# Patient Record
Sex: MALE | Race: BLACK OR AFRICAN AMERICAN | NOT HISPANIC OR LATINO
[De-identification: names, ages, dates, MRNs, and addresses within clinical notes are randomized per-mention and may not be internally consistent; named-entity substitution may affect disease eponyms.]

---

## 2017-02-07 ENCOUNTER — APPOINTMENT (OUTPATIENT)
Dept: RADIATION ONCOLOGY | Facility: CLINIC | Age: 72
End: 2017-02-07

## 2017-02-07 VITALS
BODY MASS INDEX: 27.28 KG/M2 | HEIGHT: 68 IN | SYSTOLIC BLOOD PRESSURE: 128 MMHG | DIASTOLIC BLOOD PRESSURE: 73 MMHG | OXYGEN SATURATION: 97 % | HEART RATE: 42 BPM | RESPIRATION RATE: 16 BRPM | WEIGHT: 180 LBS

## 2017-08-08 ENCOUNTER — APPOINTMENT (OUTPATIENT)
Dept: RADIATION ONCOLOGY | Facility: CLINIC | Age: 72
End: 2017-08-08
Payer: MEDICARE

## 2017-08-08 VITALS
BODY MASS INDEX: 28.98 KG/M2 | SYSTOLIC BLOOD PRESSURE: 118 MMHG | HEIGHT: 68 IN | RESPIRATION RATE: 16 BRPM | WEIGHT: 191.2 LBS | HEART RATE: 61 BPM | OXYGEN SATURATION: 100 % | TEMPERATURE: 97.3 F | DIASTOLIC BLOOD PRESSURE: 73 MMHG

## 2017-08-08 PROCEDURE — 99213 OFFICE O/P EST LOW 20 MIN: CPT

## 2017-08-22 ENCOUNTER — APPOINTMENT (OUTPATIENT)
Dept: NUCLEAR MEDICINE | Facility: IMAGING CENTER | Age: 72
End: 2017-08-22

## 2017-08-22 ENCOUNTER — APPOINTMENT (OUTPATIENT)
Dept: CT IMAGING | Facility: IMAGING CENTER | Age: 72
End: 2017-08-22

## 2018-02-06 ENCOUNTER — APPOINTMENT (OUTPATIENT)
Dept: RADIATION ONCOLOGY | Facility: CLINIC | Age: 73
End: 2018-02-06
Payer: MEDICARE

## 2018-02-27 ENCOUNTER — APPOINTMENT (OUTPATIENT)
Dept: RADIATION ONCOLOGY | Facility: CLINIC | Age: 73
End: 2018-02-27
Payer: MEDICARE

## 2018-02-27 VITALS
SYSTOLIC BLOOD PRESSURE: 143 MMHG | DIASTOLIC BLOOD PRESSURE: 80 MMHG | OXYGEN SATURATION: 99 % | HEART RATE: 53 BPM | TEMPERATURE: 98 F | BODY MASS INDEX: 28.79 KG/M2 | WEIGHT: 190 LBS | RESPIRATION RATE: 16 BRPM | HEIGHT: 68 IN

## 2018-02-27 PROCEDURE — 99213 OFFICE O/P EST LOW 20 MIN: CPT

## 2018-12-06 ENCOUNTER — APPOINTMENT (OUTPATIENT)
Dept: RADIATION ONCOLOGY | Facility: CLINIC | Age: 73
End: 2018-12-06
Payer: MEDICARE

## 2018-12-06 VITALS
WEIGHT: 187 LBS | SYSTOLIC BLOOD PRESSURE: 140 MMHG | HEIGHT: 68 IN | DIASTOLIC BLOOD PRESSURE: 60 MMHG | HEART RATE: 44 BPM | OXYGEN SATURATION: 99 % | BODY MASS INDEX: 28.34 KG/M2 | RESPIRATION RATE: 16 BRPM | TEMPERATURE: 98 F

## 2018-12-06 PROCEDURE — 99213 OFFICE O/P EST LOW 20 MIN: CPT

## 2018-12-06 NOTE — REVIEW OF SYSTEMS
[Constipation: Grade 0] : Constipation: Grade 0 [Hematuria: Grade 0] : Hematuria: Grade 0 [Urinary Retention: Grade 0] : Urinary Retention: Grade 0 [Urinary Tract Pain: Grade 0] : Urinary Tract Pain: Grade 0 [Urinary Urgency: Grade 0] : Urinary Urgency: Grade 0 [Urinary Frequency: Grade 0] : Urinary Frequency: Grade 0 [Ejaculation Disorder: Grade 0] : Ejaculation Disorder: Grade 0

## 2018-12-06 NOTE — VITALS
[Maximal Pain Intensity: 0/10] : 0/10 [Least Pain Intensity: 0/10] : 0/10 [NoTreatment Scheduled] : no treatment scheduled [90: Able to carry normal activity; minor signs or symptoms of disease.] : 90: Able to carry normal activity; minor signs or symptoms of disease.  [ECOG Performance Status: 1 - Restricted in physically strenuous activity but ambulatory and able to carry out work of a light or sedentary nature] : Performance Status: 1 - Restricted in physically strenuous activity but ambulatory and able to carry out work of a light or sedentary nature, e.g., light house work, office work

## 2018-12-10 NOTE — PHYSICAL EXAM
[Normal] : oriented to person, place and time, the affect was normal, the mood was normal and not anxious [FreeTextEntry1] : deferred [de-identified] : deferred

## 2018-12-10 NOTE — HISTORY OF PRESENT ILLNESS
[FreeTextEntry1] : Pt is urinating well.  Pt has no complaints of pain, burning on urination.  Pt has occasional nocturia maybe 2x when having a "beer".  No urgency or hesitation.  Bowels are regular.

## 2018-12-10 NOTE — LETTER GREETING
[Dear Doctor] : Dear Doctor, [Follow-Up] : Your patient, [unfilled] was seen in my office today for follow-up [Please see my note below.] : Please see my note below. [FreeTextEntry2] : Asim Dumont MD

## 2018-12-10 NOTE — REASON FOR VISIT
[Routine Follow-Up] : routine follow-up visit for [Prostate Cancer] : prostate cancer [Other: _____] : [unfilled]

## 2018-12-10 NOTE — LETTER CLOSING
[Sincerely yours,] : Sincerely yours, [FreeTextEntry3] : Kofi Walters MD\par Physician in Chief\par Department of Radiation Medicine\par [facility] [place]\par [facility] [place]\par \par  of Radiation Medicine\par [facility]\par at  [facility]\par \par Radiation \par [facility]/\par [facility] at [place]\par [address]\par [place], [place] [address]\par \par Tel: [phone]\par Fax: [phone]\par \par

## 2018-12-10 NOTE — ASSESSMENT
[No evidence of disease] : No evidence of disease [FreeTextEntry1] : no appreciable treatment related sequelae.

## 2020-03-26 ENCOUNTER — APPOINTMENT (OUTPATIENT)
Dept: RADIATION ONCOLOGY | Facility: CLINIC | Age: 75
End: 2020-03-26

## 2020-04-22 ENCOUNTER — APPOINTMENT (OUTPATIENT)
Dept: RADIATION ONCOLOGY | Facility: CLINIC | Age: 75
End: 2020-04-22
Payer: MEDICARE

## 2020-04-22 PROCEDURE — 99441: CPT

## 2020-04-22 NOTE — DISEASE MANAGEMENT
[2] : T2 [c] : c [0] : M0 [0-10] : 0 -10 ng/mL [8] : [place] Score 8 [] : Patient had a bone scan [IIB] : IIB [Primary Androgen Ablation] : Primary Androgen Ablation [Radiation Therapy] : Radiation Therapy [Treatment with radiation therapy] : Treatment with radiation therapy [BiopsyDate] : 6/3/2014 [MeasuredProstateVolume] : 23.13 cc [RadiationCompletedDate] : 12/31/2014 [EBRTDose] : 8100 cGy [EBRTFractions] : 45

## 2020-04-22 NOTE — HISTORY OF PRESENT ILLNESS
[Home] : at home, [unfilled] , at the time of the visit. [Medical Office: ([facility])___] : at the medical office located in  [Patient] : the patient [Self] : self [FreeTextEntry4] : Betty Styles, NP [FreeTextEntry1] : Mr Doss is a 75 year old male with rising PSA and Natalie 8 [id number] Adenocarcinoma of the Prostate. Started one month course of Casodex on 8/15/2014. s/p 8100 cGy Radiation therapy on 12/31/2014. Completed Lupron from 8/29/2014 - 6/17/2016. \par \par PSA Trend\par 1/4/2020      0.53 ng/ml\par 11/27/2018  0.55 ng/ml\par 5/30/2018  0.4 ng/ml\par 1/10/2017  0.07 ng/ml\par 1/13/2017  0.28 ng/ml\par 9/16/2016  0.45 ng/ml\par 6/17/2016  0.66 ng/ml\par 2/12/2016  0.65 ng/ml\par 10/9/2015  0.92 ng/ml\par 6/5/2015    0.54 ng/ml\par 1/23/2015  1.07 ng/ml\par 8/29/2014  4.9 ng/ml\par 8/15/2014  17.5 ng/ml\par 6.38  @ Diagnosis\par \par He denies urinary urgency or hesitation. Denies bowel issues.

## 2020-04-22 NOTE — ASSESSMENT
[No evidence of disease] : No evidence of disease [FreeTextEntry1] : very modest stable treatment related sequelae, largely sexual dysfunction.

## 2020-04-22 NOTE — REVIEW OF SYSTEMS
[IPSS Score (0-40): ___] : IPSS score: [unfilled] [EPIC-CP Score (0-60): ___] : EPIC-CP score: [unfilled] [Urinary Urgency: Grade 0] : Urinary Urgency: Grade 0 [Urinary Frequency: Grade 0] : Urinary Frequency: Grade 0 [Negative] : Allergic/Immunologic

## 2021-05-20 ENCOUNTER — APPOINTMENT (OUTPATIENT)
Dept: RADIATION ONCOLOGY | Facility: CLINIC | Age: 76
End: 2021-05-20
Payer: MEDICARE

## 2021-05-20 PROCEDURE — 99024 POSTOP FOLLOW-UP VISIT: CPT

## 2021-05-21 NOTE — REVIEW OF SYSTEMS
[Urinary Urgency: Grade 0] : Urinary Urgency: Grade 0 [Urinary Frequency: Grade 0] : Urinary Frequency: Grade 0 [IPSS Score (0-40): ___] : IPSS score: [unfilled] [EPIC-CP Score (0-60): ___] : EPIC-CP score: [unfilled] [Negative] : Genitourinary

## 2021-05-21 NOTE — HISTORY OF PRESENT ILLNESS
[Home] : at home, [unfilled] , at the time of the visit. [Medical Office: ([facility])___] : at the medical office located in  [Patient] : the patient [FreeTextEntry1] : Mr Doss is a 76 year old male is conferencing today for routine follow-up.  He initially presented with rising PSA and [place] 8 [id number] Adenocarcinoma of the Prostate.  Started one month course of Casodex on 8/15/2014. He completed radiation therapy (8100 cGy) to the prosate on 12/31/2014. Completed Lupron from 8/29/2014 - 6/17/2016. \par \par PSA Trend:\par 2/16/2021 -- 0.7 ng/ml\par 1/4/2020 -- 0.53 ng/ml\par 11/27/2018 -- 0.55 ng/ml\par 5/30/2018 -- 0.4 ng/ml\par 1/10/2017 --  0.07 ng/ml\par 1/13/2017 --  0.28 ng/ml\par 9/16/2016 -- 0.45 ng/ml\par 6/17/2016 -- 0.66 ng/ml\par 2/12/2016 -- 0.65 ng/ml\par 10/9/2015 -- 0.92 ng/ml\par 6/5/2015 --  0.54 ng/ml\par 1/23/2015 -- 1.07 ng/ml\par 8/29/2014 -- 4.9 ng/ml\par 8/15/2014 -- 17.5 ng/ml\par 6.38  @ Diagnosis

## 2021-05-21 NOTE — DISEASE MANAGEMENT
[2] : T2 [c] : c [0] : M0 [0-10] : 0 -10 ng/mL [8] : [place] Score 8 [] : Patient had a bone scan [IIB] : IIB [Primary Androgen Ablation] : Primary Androgen Ablation [Radiation Therapy] : Radiation Therapy [Treatment with radiation therapy] : Treatment with radiation therapy [BiopsyDate] : 6/3/2014 [MeasuredProstateVolume] : 23.13 cc [RadiationCompletedDate] : 12/31/2014 [EBRTDose] : 8100 cGy [EBRTFractions] : 45

## 2021-05-21 NOTE — LETTER CLOSING
[Sincerely yours,] : Sincerely yours, [FreeTextEntry3] : Kofi Walters MD\par Physician in Chief\par Department of Radiation Medicine\par [facility] [place]\par [facility] [place]\par \par  of Radiation Medicine\par [facility]\par at  [facility]\par \par Radiation \par [facility]/\par [facility] at [place]\par [address]\par [place], [place] [address]\par \par Tel: [phone]\par Fax: [phone]\par

## 2022-05-24 ENCOUNTER — APPOINTMENT (OUTPATIENT)
Dept: RADIATION ONCOLOGY | Facility: CLINIC | Age: 77
End: 2022-05-24
Payer: MEDICARE

## 2022-05-24 VITALS
BODY MASS INDEX: 27.22 KG/M2 | DIASTOLIC BLOOD PRESSURE: 75 MMHG | RESPIRATION RATE: 16 BRPM | WEIGHT: 179 LBS | HEART RATE: 73 BPM | SYSTOLIC BLOOD PRESSURE: 152 MMHG | OXYGEN SATURATION: 99 %

## 2022-05-24 PROCEDURE — 99213 OFFICE O/P EST LOW 20 MIN: CPT

## 2022-05-25 NOTE — PHYSICAL EXAM
[Normal] : normoactive bowel sounds, soft and nontender, no hepatosplenomegaly or masses appreciated [FreeTextEntry1] : deferred [de-identified] : deferred

## 2022-05-25 NOTE — REVIEW OF SYSTEMS
[IPSS Score (0-40): ___] : IPSS score: [unfilled] [Urinary Urgency: Grade 0] : Urinary Urgency: Grade 0 [Urinary Frequency: Grade 0] : Urinary Frequency: Grade 0 [EPIC-CP Score (0-60): ___] : EPIC-CP score: [unfilled] [Negative] : Gastrointestinal

## 2022-05-25 NOTE — DISEASE MANAGEMENT
[2] : T2 [c] : c [0] : M0 [0-10] : 0 -10 ng/mL [] : Patient had a bone scan [IIB] : IIB [Primary Androgen Ablation] : Primary Androgen Ablation [Radiation Therapy] : Radiation Therapy [Treatment with radiation therapy] : Treatment with radiation therapy [MeasuredProstateVolume] : 23.13 cc [RadiationCompletedDate] : 12/31/2014 [EBRTDose] : 8100 cGy [EBRTFractions] : 45

## 2022-05-25 NOTE — HISTORY OF PRESENT ILLNESS
[FreeTextEntry1] : Mr Doss is a 77 year-old male is in office  today for routine follow-up.  He initially presented with rising PSA and Natalie 8, [id number] Adenocarcinoma of the Prostate.  Started one month course of Casodex on 8/15/2014. He completed radiation therapy (8100 cGy) to the prostate on 12/31/2014.  Completed Lupron from 8/29/2014 - 6/17/2016. \par \par PSA Trend:\par 10/22/2021 -- 0.57 ng/mL\par 2/16/2021 -- 0.7 ng/ml\par 1/4/2020 -- 0.53 ng/ml\par 11/27/2018 -- 0.55 ng/ml\par 5/30/2018 -- 0.4 ng/ml\par 1/10/2017 --  0.07 ng/ml\par 1/13/2017 --  0.28 ng/ml\par 9/16/2016 -- 0.45 ng/ml\par 6/17/2016 -- 0.66 ng/ml\par 2/12/2016 -- 0.65 ng/ml\par 10/9/2015 -- 0.92 ng/ml\par 6/5/2015 --  0.54 ng/ml\par 1/23/2015 -- 1.07 ng/ml\par 8/29/2014 -- 4.9 ng/ml\par 8/15/2014 -- 17.5 ng/ml\par 6.38  @ Diagnosis

## 2022-05-25 NOTE — LETTER CLOSING
[Sincerely yours,] : Sincerely yours, [FreeTextEntry3] : Kofi Walters MD\par Physician in Chief\par Department of Radiation Medicine\par [facility] [place]\par [facility] [place]\par \par  of Radiation Medicine\par [facility]\par at  [facility]\par \par Radiation \par [facility]/\par [facility] Imaging at [place]\par [address]\par [place], [place] [address]\par \par Tel: [phone]\par Fax: [phone]\par

## 2022-07-06 ENCOUNTER — INPATIENT (INPATIENT)
Facility: HOSPITAL | Age: 77
LOS: 0 days | Discharge: HOME | DRG: 310 | End: 2022-07-07
Attending: HOSPITALIST | Admitting: HOSPITALIST
Payer: COMMERCIAL

## 2022-07-06 VITALS
RESPIRATION RATE: 18 BRPM | HEART RATE: 47 BPM | SYSTOLIC BLOOD PRESSURE: 153 MMHG | OXYGEN SATURATION: 98 % | TEMPERATURE: 98 F | WEIGHT: 173.28 LBS

## 2022-07-06 DIAGNOSIS — E78.5 HYPERLIPIDEMIA, UNSPECIFIED: ICD-10-CM

## 2022-07-06 DIAGNOSIS — I49.9 CARDIAC ARRHYTHMIA, UNSPECIFIED: ICD-10-CM

## 2022-07-06 DIAGNOSIS — I10 ESSENTIAL (PRIMARY) HYPERTENSION: ICD-10-CM

## 2022-07-06 DIAGNOSIS — I47.2 VENTRICULAR TACHYCARDIA: ICD-10-CM

## 2022-07-06 LAB
ALBUMIN SERPL ELPH-MCNC: 4 G/DL — SIGNIFICANT CHANGE UP (ref 3.3–5.2)
ALP SERPL-CCNC: 65 U/L — SIGNIFICANT CHANGE UP (ref 40–120)
ALT FLD-CCNC: 14 U/L — SIGNIFICANT CHANGE UP
ANION GAP SERPL CALC-SCNC: 13 MMOL/L — SIGNIFICANT CHANGE UP (ref 5–17)
AST SERPL-CCNC: 23 U/L — SIGNIFICANT CHANGE UP
BASOPHILS # BLD AUTO: 0.03 K/UL — SIGNIFICANT CHANGE UP (ref 0–0.2)
BASOPHILS NFR BLD AUTO: 0.7 % — SIGNIFICANT CHANGE UP (ref 0–2)
BILIRUB SERPL-MCNC: 0.2 MG/DL — LOW (ref 0.4–2)
BUN SERPL-MCNC: 23.3 MG/DL — HIGH (ref 8–20)
CALCIUM SERPL-MCNC: 9.1 MG/DL — SIGNIFICANT CHANGE UP (ref 8.6–10.2)
CHLORIDE SERPL-SCNC: 106 MMOL/L — SIGNIFICANT CHANGE UP (ref 98–107)
CO2 SERPL-SCNC: 23 MMOL/L — SIGNIFICANT CHANGE UP (ref 22–29)
CREAT SERPL-MCNC: 2.38 MG/DL — HIGH (ref 0.5–1.3)
EGFR: 28 ML/MIN/1.73M2 — LOW
EOSINOPHIL # BLD AUTO: 0.28 K/UL — SIGNIFICANT CHANGE UP (ref 0–0.5)
EOSINOPHIL NFR BLD AUTO: 6.5 % — HIGH (ref 0–6)
GLUCOSE SERPL-MCNC: 78 MG/DL — SIGNIFICANT CHANGE UP (ref 70–99)
HCT VFR BLD CALC: 40.8 % — SIGNIFICANT CHANGE UP (ref 39–50)
HGB BLD-MCNC: 14.2 G/DL — SIGNIFICANT CHANGE UP (ref 13–17)
HIV 1 & 2 AB SERPL IA.RAPID: SIGNIFICANT CHANGE UP
IMM GRANULOCYTES NFR BLD AUTO: 0.2 % — SIGNIFICANT CHANGE UP (ref 0–1.5)
LYMPHOCYTES # BLD AUTO: 1.1 K/UL — SIGNIFICANT CHANGE UP (ref 1–3.3)
LYMPHOCYTES # BLD AUTO: 25.6 % — SIGNIFICANT CHANGE UP (ref 13–44)
MAGNESIUM SERPL-MCNC: 2.3 MG/DL — SIGNIFICANT CHANGE UP (ref 1.6–2.6)
MCHC RBC-ENTMCNC: 34.6 PG — HIGH (ref 27–34)
MCHC RBC-ENTMCNC: 34.8 GM/DL — SIGNIFICANT CHANGE UP (ref 32–36)
MCV RBC AUTO: 99.5 FL — SIGNIFICANT CHANGE UP (ref 80–100)
MONOCYTES # BLD AUTO: 0.44 K/UL — SIGNIFICANT CHANGE UP (ref 0–0.9)
MONOCYTES NFR BLD AUTO: 10.2 % — SIGNIFICANT CHANGE UP (ref 2–14)
NEUTROPHILS # BLD AUTO: 2.44 K/UL — SIGNIFICANT CHANGE UP (ref 1.8–7.4)
NEUTROPHILS NFR BLD AUTO: 56.8 % — SIGNIFICANT CHANGE UP (ref 43–77)
PLATELET # BLD AUTO: 203 K/UL — SIGNIFICANT CHANGE UP (ref 150–400)
POTASSIUM SERPL-MCNC: 4.4 MMOL/L — SIGNIFICANT CHANGE UP (ref 3.5–5.3)
POTASSIUM SERPL-SCNC: 4.4 MMOL/L — SIGNIFICANT CHANGE UP (ref 3.5–5.3)
PROT SERPL-MCNC: 6.6 G/DL — SIGNIFICANT CHANGE UP (ref 6.6–8.7)
RBC # BLD: 4.1 M/UL — LOW (ref 4.2–5.8)
RBC # FLD: 13.5 % — SIGNIFICANT CHANGE UP (ref 10.3–14.5)
SODIUM SERPL-SCNC: 142 MMOL/L — SIGNIFICANT CHANGE UP (ref 135–145)
T3 SERPL-MCNC: 70 NG/DL — LOW (ref 80–200)
T4 AB SER-ACNC: 5.5 UG/DL — SIGNIFICANT CHANGE UP (ref 4.5–12)
TROPONIN T SERPL-MCNC: <0.01 NG/ML — SIGNIFICANT CHANGE UP (ref 0–0.06)
TSH SERPL-MCNC: 3.49 UIU/ML — SIGNIFICANT CHANGE UP (ref 0.27–4.2)
WBC # BLD: 4.3 K/UL — SIGNIFICANT CHANGE UP (ref 3.8–10.5)
WBC # FLD AUTO: 4.3 K/UL — SIGNIFICANT CHANGE UP (ref 3.8–10.5)

## 2022-07-06 PROCEDURE — 71046 X-RAY EXAM CHEST 2 VIEWS: CPT | Mod: 26

## 2022-07-06 PROCEDURE — 99223 1ST HOSP IP/OBS HIGH 75: CPT

## 2022-07-06 PROCEDURE — 99285 EMERGENCY DEPT VISIT HI MDM: CPT

## 2022-07-06 PROCEDURE — 93010 ELECTROCARDIOGRAM REPORT: CPT | Mod: 76

## 2022-07-06 NOTE — CONSULT NOTE ADULT - PROBLEM SELECTOR RECOMMENDATION 9
Pt with intermmittent episodes of palpitations, now asymptomatic however bradicardic in 40's on monitor  Hold monitor report states: Underlying sinus rhythm with rates from 44 to 93 bpm with 1st degree AV block. Frequent VE beats with 939 pairs and 93 runs. The longest/fastest run 21 beats @ 183 bpm. There was 1126 bigeminy 97 trigeminy and 103 quadrigeminy episode noted. Rare SVE beats noted with 5 pairs.   Pt needs meds reconciliation  Obtain Echo report from today (performed as outpatient)   Telemonitor for overnight observation   Serial EKGs  A1C, lipid panel fasting, TFTs, sed rate to ro comorbidities   Stress test   Maintain K+~4 and mag~2  DASH diet Pt with intermmittent episodes of palpitations, now asymptomatic however bradicardic in 40's on monitor  Hold monitor report states: Underlying sinus rhythm with rates from 44 to 93 bpm with 1st degree AV block. Frequent VE beats with 939 pairs and 93 runs. The longest/fastest run 21 beats @ 183 bpm. There was 1126 bigeminy 97 trigeminy and 103 quadrigeminy episode noted. Rare SVE beats noted with 5 pairs.   Pt needs meds reconciliation  Place pacer pads and atropine at bedside   Avoid all AV blockers for now   Obtain Echo report from today (performed as outpatient)   Telemonitor for overnight observation   Serial EKGs  A1C, lipid panel fasting, TFTs, lyme disease, sed rate to ro comorbidities   Stress test   Maintain K+~4 and mag~2  DASH diet Pt with intermittent episodes of palpitations, now asymptomatic however bradycardic in 40's on monitor  Hold monitor report states: Underlying sinus rhythm with rates from 44 to 93 bpm with 1st degree AV block. Frequent VE beats with 939 pairs and 93 runs. The longest/fastest run 21 beats @ 183 bpm. There was 1126 bigeminy 97 trigeminy and 103 quadrigeminy episode noted. Rare SVE beats noted with 5 pairs.   Pt needs meds reconciliation  Place pacer pads and atropine at bedside   Avoid all AV blockers for now   Obtain Echo report from today (performed as outpatient)   Telemonitor for overnight observation   Serial EKGs  A1C, lipid panel fasting, TFTs, lyme disease, sed rate to ro comorbidities   Stress test   Maintain K+~4 and mag~2  DASH diet Pt with intermittent episodes of palpitations, now asymptomatic however bradycardic in 40's on monitor  Hold monitor report states: Underlying sinus rhythm with rates from 44 to 93 bpm with 1st degree AV block. Frequent VE beats with 939 pairs and 93 runs. The longest/fastest run 21 beats @ 183 bpm. There was 1126 bigeminy 97 trigeminy and 103 quadrigeminy episode noted. Rare SVE beats noted with 5 pairs.   Pt needs meds reconciliation  Place pacer pads and atropine at bedside   Keep NPO  Avoid all AV blockers for now   Obtain Echo report from today (performed as outpatient)   Telemonitor for overnight observation   Serial EKGs  A1C, lipid panel fasting, TFTs, lyme disease, sed rate to ro comorbidities   Stress test   Maintain K+~4 and mag~2  DASH diet  EP consult

## 2022-07-06 NOTE — CONSULT NOTE ADULT - SUBJECTIVE AND OBJECTIVE BOX
[facility]                                              CARDIOLOGY AT [address], [place] [address]                                             Telephone: [phone]. Fax:[phone]      CARDIOLOGY CONSULTATION NOTE                                                                                             History obtained by: Patient and medical record  Community Cardiologist: Dr Reyes    obtained: No  Reason for Consultation: NSVT evaluation    Chief complaint:   78 yo Male complaining of irregular heartbeat.    HPI: 78 yo male with PMHx of HTN, HLD, and hypothyrodism presents to ED c/o irregular heartbeat. Pt states a couple months ago he felt a racing sensation while he was laying in bed, states he came to ed but had normal ekg. States he had an echo today which was abnormal, showing extra heartbeats so he was referred to ED for further evaluation. Pt states he was on a monitor at home for 24 hours, taken off this morning which was normal as well. States he feels otherwise normal.   denies nausea, vomiting, myalgia, other medical complaints    CARDIAC TESTING     PAST MEDICAL HISTORY  HTN  HLD  Hypothyroidism     PAST SURGICAL HISTORY  No pertinent PSHx    SOCIAL HISTORY: + alcohol use 6 drink of rum on weekends. Denies smoking/drugs    FAMILY HISTORY:  Mother MI at 61 yo    Family History of Cardiovascular Disease:  Yes   Coronary Artery Disease in first degree relative: Yes   Sudden Cardiac Death in First degree relative: No     HOME MEDICATIONS:   * Outpatient Medication Status not yet specified    ALLERGIES:   No Known Allergies    REVIEW OF SYMPTOMS: Asymptomatic at this time   CONSTITUTIONAL: No fever, no chills, no weight loss, no weight gain, no fatigue   ENMT:  No vertigo; No sinus or throat pain  NECK: No pain or stiffness  CARDIOVASCULAR: No chest pain, no dyspnea, no syncope/presyncope, no fatigue, no palpitations (resolved), no dizziness, no Orthopnea, no Paroxsymal nocturnal dyspnea  RESPIRATORY: No shortness of breath, no cough, no wheezing  : No dysuria, no hematuria   GI: no nausea, no diarrhea, no constipation, no abdominal pain,   NEURO: No headache, no slurred speech   MUSCULOSKELETAL: No joint pain or swelling; No muscle, back, or extremity pain  PSYCH: No agitation, no anxiety.    ALL OTHER REVIEW OF SYSTEMS ARE NEGATIVE.    VITAL SIGNS:   T(C): 36.8 (07-06-22 @ 18:07), Max: 36.8 (07-06-22 @ 18:07)  T(F): 98.3 (07-06-22 @ 18:07), Max: 98.3 (07-06-22 @ 18:07)  HR: 47 (07-06-22 @ 18:07) (47 - 47)  BP: 153/- (07-06-22 @ 18:07) (153/- - 153/-)  RR: 18 (07-06-22 @ 18:07) (18 - 18)  SpO2: 98% (07-06-22 @ 18:07) (98% - 98%)    PHYSICAL EXAM:   Constitutional: Comfortable . No acute distress.   HEENT: Atraumatic and normocephalic , neck is supple . no JVD.   CNS: A&Ox3. No focal deficits.   Respiratory: CTAB, unlabored. No wheezing, No crackles or rhonchi   Cardiovascular: + Bradycardic. normal s1 s2. No murmur. No rubs or gallop.  Gastrointestinal: Soft, non-tender. +Bowel sounds.   Extremities: 2+ Peripheral Pulses, No cyanosis, or edema  Psychiatric: Calm . no agitation.   Skin: Warm and dry    LABS:   ( 06 Jul 2022 21:06 )  Troponin T  <0.01,  CPK  X    , CKMB  X    , BNP X                                  14.2   4.30  )-----------( 203      ( 06 Jul 2022 21:06 )             40.8     07-06    142  |  106  |  23.3<H>  ----------------------------<  78  4.4   |  23.0  |  2.38<H>    Ca    9.1      06 Jul 2022 21:06  Mg     2.3     07-06    TPro  6.6  /  Alb  4.0  /  TBili  0.2<L>  /  DBili  x   /  AST  23  /  ALT  14  /  AlkPhos  65  07-06            Thyroid Stimulating Hormone, Serum: 3.49 uIU/mL (07-06-22 @ 21:06)      ECG:   Prior ECG: Yes [  ] No [  ]    RADIOLOGY & ADDITIONAL STUDIES:    X-ray:    CT scan:   MRI:   US:    Preliminary evaluation, please await official recommendations     Assessment and recommendations are final when note is signed by the attending.                                      [facility]                                              CARDIOLOGY AT [address], [place] [address]                                             Telephone: [phone]. Fax:[phone]      CARDIOLOGY CONSULTATION NOTE                                                                                             History obtained by: Patient and medical record  Community Cardiologist: Dr Reyes    obtained: No  Reason for Consultation: NSVT evaluation    Chief complaint:   78 yo Male complaining of irregular heartbeat.    HPI: 78 yo male with PMHx of HTN, HLD, and hypothyrodism presents to ED c/o irregular heartbeat. Pt states a couple months ago he felt a racing sensation while he was laying in bed, states he came to ed but had normal ekg. States he had an echo today which was abnormal, showing extra heartbeats so he was referred to ED for further evaluation. Pt states he was on a monitor at home for 24 hours, taken off this morning which was normal as well. States he feels otherwise normal.   denies nausea, vomiting, myalgia, other medical complaints    CARDIAC TESTING     PAST MEDICAL HISTORY  HTN  HLD  Hypothyroidism     PAST SURGICAL HISTORY  No pertinent PSHx    SOCIAL HISTORY: + alcohol use 6 drink of rum on weekends. Denies smoking/drugs    FAMILY HISTORY:  Mother MI at 61 yo    Family History of Cardiovascular Disease:  Yes   Coronary Artery Disease in first degree relative: Yes   Sudden Cardiac Death in First degree relative: No     HOME MEDICATIONS:   * Outpatient Medication Status not yet specified    ALLERGIES:   No Known Allergies    REVIEW OF SYMPTOMS: Asymptomatic at this time   CONSTITUTIONAL: No fever, no chills, no weight loss, no weight gain, no fatigue   ENMT:  No vertigo; No sinus or throat pain  NECK: No pain or stiffness  CARDIOVASCULAR: No chest pain, no dyspnea, no syncope/presyncope, no fatigue, no palpitations (resolved), no dizziness, no Orthopnea, no Paroxsymal nocturnal dyspnea  RESPIRATORY: No shortness of breath, no cough, no wheezing  : No dysuria, no hematuria   GI: no nausea, no diarrhea, no constipation, no abdominal pain,   NEURO: No headache, no slurred speech   MUSCULOSKELETAL: No joint pain or swelling; No muscle, back, or extremity pain  PSYCH: No agitation, no anxiety.    ALL OTHER REVIEW OF SYSTEMS ARE NEGATIVE.    VITAL SIGNS:   T(C): 36.8 (07-06-22 @ 18:07), Max: 36.8 (07-06-22 @ 18:07)  T(F): 98.3 (07-06-22 @ 18:07), Max: 98.3 (07-06-22 @ 18:07)  HR: 47 (07-06-22 @ 18:07) (47 - 47)  BP: 153/- (07-06-22 @ 18:07) (153/- - 153/-)  RR: 18 (07-06-22 @ 18:07) (18 - 18)  SpO2: 98% (07-06-22 @ 18:07) (98% - 98%)    PHYSICAL EXAM:   Constitutional: Comfortable . No acute distress.   HEENT: Atraumatic and normocephalic , neck is supple . no JVD.   CNS: A&Ox3. No focal deficits.   Respiratory: CTAB, unlabored. No wheezing, No crackles or rhonchi   Cardiovascular: + Bradycardic. normal s1 s2. No murmur. No rubs or gallop.  Gastrointestinal: Soft, non-tender. +Bowel sounds.   Extremities: 2+ Peripheral Pulses, No cyanosis, or edema  Psychiatric: Calm . no agitation.   Skin: Warm and dry    LABS:   ( 06 Jul 2022 21:06 )  Troponin T  <0.01,  CPK  X    , CKMB  X    , BNP X                            14.2   4.30  )-----------( 203      ( 06 Jul 2022 21:06 )             40.8     07-06    142  |  106  |  23.3<H>  ----------------------------<  78  4.4   |  23.0  |  2.38<H>    Ca    9.1      06 Jul 2022 21:06  Mg     2.3     07-06    TPro  6.6  /  Alb  4.0  /  TBili  0.2<L>  /  DBili  x   /  AST  23  /  ALT  14  /  AlkPhos  65  07-06    Thyroid Stimulating Hormone, Serum: 3.49 uIU/mL (07-06-22 @ 21:06)      ECG:   Prior ECG: Yes     RADIOLOGY & ADDITIONAL STUDIES:     Chest x-ray:     Preliminary evaluation, please await official recommendations     Assessment and recommendations are final when note is signed by the attending.                                      [facility]                                              CARDIOLOGY AT [address], [place] [address]                                             Telephone: [phone]. Fax:[phone]      CARDIOLOGY CONSULTATION NOTE                                                                                             History obtained by: Patient and medical record  Community Cardiologist: Dr Reyes    obtained: No  Reason for Consultation: NSVT evaluation    Chief complaint:   76 yo Male complaining of irregular heartbeat.    HPI: 76 yo male with PMHx of HTN, HLD, and hypothyrodism presents to ED c/o irregular heartbeat. Pt states a couple months ago he felt a racing sensation while he was laying in bed, states he came to ed but had normal ekg. States he had an echo today which was abnormal, showing extra heartbeats so he was referred to ED for further evaluation. Pt states he was on a monitor at home for 24 hours, taken off this morning which was normal as well. States he feels otherwise normal.   denies nausea, vomiting, myalgia, other medical complaints    CARDIAC TESTING     PAST MEDICAL HISTORY  HTN  HLD  Hypothyroidism     PAST SURGICAL HISTORY  No pertinent PSHx    SOCIAL HISTORY: + alcohol use 6 drink of rum on weekends. Denies smoking/drugs    FAMILY HISTORY:  Mother MI at 63 yo    Family History of Cardiovascular Disease:  Yes   Coronary Artery Disease in first degree relative: Yes   Sudden Cardiac Death in First degree relative: No     HOME MEDICATIONS:   * Outpatient Medication Status not yet specified    ALLERGIES:   No Known Allergies    REVIEW OF SYMPTOMS: Asymptomatic at this time   CONSTITUTIONAL: No fever, no chills, no weight loss, no weight gain, no fatigue   ENMT:  No vertigo; No sinus or throat pain  NECK: No pain or stiffness  CARDIOVASCULAR: No chest pain, no dyspnea, no syncope/presyncope, no fatigue, no palpitations (resolved), no dizziness, no Orthopnea, no Paroxsymal nocturnal dyspnea  RESPIRATORY: No shortness of breath, no cough, no wheezing  : No dysuria, no hematuria   GI: no nausea, no diarrhea, no constipation, no abdominal pain,   NEURO: No headache, no slurred speech   MUSCULOSKELETAL: No joint pain or swelling; No muscle, back, or extremity pain  PSYCH: No agitation, no anxiety.    ALL OTHER REVIEW OF SYSTEMS ARE NEGATIVE.    VITAL SIGNS:   T(C): 36.8 (07-06-22 @ 18:07), Max: 36.8 (07-06-22 @ 18:07)  T(F): 98.3 (07-06-22 @ 18:07), Max: 98.3 (07-06-22 @ 18:07)  HR: 47 (07-06-22 @ 18:07) (47 - 47)  BP: 153/- (07-06-22 @ 18:07) (153/- - 153/-)  RR: 18 (07-06-22 @ 18:07) (18 - 18)  SpO2: 98% (07-06-22 @ 18:07) (98% - 98%)    PHYSICAL EXAM:   Constitutional: Comfortable . No acute distress.   HEENT: Atraumatic and normocephalic , neck is supple . no JVD.   CNS: A&Ox3. No focal deficits.   Respiratory: CTAB, unlabored. No wheezing, No crackles or rhonchi   Cardiovascular: + Bradycardic. normal s1 s2. No murmur. No rubs or gallop.  Gastrointestinal: Soft, non-tender. +Bowel sounds.   Extremities: 2+ Peripheral Pulses, No cyanosis, or edema  Psychiatric: Calm . no agitation.   Skin: Warm and dry    LABS:   ( 06 Jul 2022 21:06 )    Troponin T  <0.01,  CPK  X    , CKMB  X    , BNP X                            14.2   4.30  )-----------( 203      ( 06 Jul 2022 21:06 )             40.8     07-06    142  |  106  |  23.3<H>  ----------------------------<  78  4.4   |  23.0  |  2.38<H>    Ca    9.1      06 Jul 2022 21:06  Mg     2.3     07-06    TPro  6.6  /  Alb  4.0  /  TBili  0.2<L>  /  DBili  x   /  AST  23  /  ALT  14  /  AlkPhos  65  07-06    Thyroid Stimulating Hormone, Serum: 3.49 uIU/mL (07-06-22 @ 21:06)      ECG:   Prior ECG: Yes     RADIOLOGY & ADDITIONAL STUDIES:     Chest x-ray: Unremarkable. Final report/reading pending     Preliminary evaluation, please await official recommendations     Assessment and recommendations are final when note is signed by the attending.                                      [facility]                                              CARDIOLOGY AT [address], [place] [address]                                             Telephone: [phone]. Fax:[phone]      CARDIOLOGY CONSULTATION NOTE                                                                                             History obtained by: Patient and medical record  Community Cardiologist: Dr Reyes    obtained: No  Reason for Consultation: NSVT evaluation    Chief complaint:   76 yo Male complaining of irregular heartbeat.    HPI: 76 yo male with PMHx of HTN, HLD, and hypothyrodism presents to ED c/o irregular heartbeat. Pt states a couple months ago he felt a racing sensation while he was laying in bed, states he came to ed but had normal ekg. States he had an echo today which was abnormal, showing extra heartbeats so he was referred to ED for further evaluation. Pt states he was on a monitor at home for 24 hours, taken off this morning which was normal as well. States he feels otherwise normal.   denies nausea, vomiting, myalgia, other medical complaints    CARDIAC TESTING     PAST MEDICAL HISTORY  HTN  HLD  Hypothyroidism     PAST SURGICAL HISTORY  No pertinent PSHx    SOCIAL HISTORY: + alcohol use 6 drink of rum on weekends. Denies smoking/drugs    FAMILY HISTORY:  Mother MI at 61 yo    Family History of Cardiovascular Disease:  Yes   Coronary Artery Disease in first degree relative: Yes   Sudden Cardiac Death in First degree relative: No     HOME MEDICATIONS:   * Outpatient Medication Status not yet specified    ALLERGIES:   No Known Allergies    REVIEW OF SYMPTOMS: Asymptomatic at this time   CONSTITUTIONAL: No fever, no chills, no weight loss, no weight gain, no fatigue   ENMT:  No vertigo; No sinus or throat pain  NECK: No pain or stiffness  CARDIOVASCULAR: No chest pain, no dyspnea, no syncope/presyncope, no fatigue, no palpitations (resolved), no dizziness, no Orthopnea, no Paroxsymal nocturnal dyspnea  RESPIRATORY: No shortness of breath, no cough, no wheezing  : No dysuria, no hematuria   GI: no nausea, no diarrhea, no constipation, no abdominal pain,   NEURO: No headache, no slurred speech   MUSCULOSKELETAL: No joint pain or swelling; No muscle, back, or extremity pain  PSYCH: No agitation, no anxiety.    ALL OTHER REVIEW OF SYSTEMS ARE NEGATIVE.    VITAL SIGNS:   T(C): 36.8 (07-06-22 @ 18:07), Max: 36.8 (07-06-22 @ 18:07)  T(F): 98.3 (07-06-22 @ 18:07), Max: 98.3 (07-06-22 @ 18:07)  HR: 47 (07-06-22 @ 18:07) (47 - 47)  BP: 153/- (07-06-22 @ 18:07) (153/- - 153/-)  RR: 18 (07-06-22 @ 18:07) (18 - 18)  SpO2: 98% (07-06-22 @ 18:07) (98% - 98%)    PHYSICAL EXAM:   Constitutional: Comfortable . No acute distress.   HEENT: Atraumatic and normocephalic , neck is supple . no JVD.   CNS: A&Ox3. No focal deficits.   Respiratory: CTAB, unlabored. No wheezing, No crackles or rhonchi   Cardiovascular: + Bradycardic. normal s1 s2. No murmur. No rubs or gallop.  Gastrointestinal: Soft, non-tender. +Bowel sounds.   Extremities: 2+ Peripheral Pulses, No cyanosis, or edema  Psychiatric: Calm . no agitation.   Skin: Warm and dry    LABS:   ( 06 Jul 2022 21:06 )    Troponin T  <0.01,  CPK  X    , CKMB  X    , BNP X                            14.2   4.30  )-----------( 203      ( 06 Jul 2022 21:06 )             40.8     07-06    142  |  106  |  23.3<H>  ----------------------------<  78  4.4   |  23.0  |  2.38<H>    Ca    9.1      06 Jul 2022 21:06  Mg     2.3     07-06    TPro  6.6  /  Alb  4.0  /  TBili  0.2<L>  /  DBili  x   /  AST  23  /  ALT  14  /  AlkPhos  65  07-06    Thyroid Stimulating Hormone, Serum: 3.49 uIU/mL (07-06-22 @ 21:06)      ECG: Sinus bradycardia with 1st degree AV block  Prior ECG: No     RADIOLOGY & ADDITIONAL STUDIES:     Chest x-ray: Unremarkable. Final report/reading pending     Preliminary evaluation, please await official recommendations     Assessment and recommendations are final when note is signed by the attending.

## 2022-07-06 NOTE — CONSULT NOTE ADULT - ASSESSMENT
obtain Echo report   Meds reconcilliation   record BP  78 yo male  presents to ED c/o irregular heartbeat.     78 yo male  presents to ED c/o irregular heartbeat.

## 2022-07-06 NOTE — CONSULT NOTE ADULT - PROBLEM SELECTOR RECOMMENDATION 3
Lipid panel fasting  Continue Statins. Monitor LFTs  DASH diet    Further recommendations base on above findings

## 2022-07-06 NOTE — CONSULT NOTE ADULT - NS ATTEND AMEND GEN_ALL_CORE FT
Patient was seen and examined this morning with no complaints of chest pain or shortness of breath   Patient presented from the outpatient setting with multiple episodes of NSVT   No palpitations   TTE 7/6/2022: 1. LV cavity size is normal. LV Ejection Fraction is 50-55 %. Left ventricular diastolic parameters are consistent with mild  (Grade I/Grade IA) diastolic dysfunction (impaired relaxation).  2. The left atrium is normal in size.  3. Mildly dilated right atrium.  4. There is moderate primary mitral valve regurgitation.  5. Aortic valve cusps appear mildly calcified. No aortic valve stenosis. Moderate aortic valve regurgitation.  6. There is trivial tricuspid regurgitation.  7. There is moderate pulmonic regurgitation.  8. There is mild aortic root dilation.    Dx: NSVT / Bradycardia, HTN and Dyslipidemia   - patient notes that he si asymptomatic with no complaints of palpitations   - EKG shows sinus bradycardia @ 51 bpm with first degree AV block   - patient was on Cardizem 260mg and hold due to bradycardia  - Holter monitor in the office showed underlying sinus rhythm rates 44-93 bpm frequent VEs beats  939 pairs and 93 runs. The longest run 21 beats  @ 183 bpm. There was 1126 bigeminy and 103 quadrigeminy   - refer NM Myocardial to assess for any ishemia or infarct   - EP kemar Ayoub D.O. [facility]  Cardiology/Vascular Cardiology -[facility] Cardiology   Telephone # [phone] Patient was seen and examined this morning with no complaints of chest pain or shortness of breath   Patient presented from the outpatient setting with multiple episodes of NSVT   No palpitations   TTE 7/6/2022: 1. LV cavity size is normal. LV Ejection Fraction is 50-55 %. Left ventricular diastolic parameters are consistent with mild  (Grade I/Grade IA) diastolic dysfunction (impaired relaxation).  2. The left atrium is normal in size.  3. Mildly dilated right atrium.  4. There is moderate primary mitral valve regurgitation.  5. Aortic valve cusps appear mildly calcified. No aortic valve stenosis. Moderate aortic valve regurgitation.  6. There is trivial tricuspid regurgitation.  7. There is moderate pulmonic regurgitation.  8. There is mild aortic root dilation.    Dx: NSVT / Bradycardia, HTN and Dyslipidemia   - patient notes that he si asymptomatic with no complaints of palpitations   - EKG shows sinus bradycardia @ 51 bpm with first degree AV block   - patient was on Cardizem 260mg and hold due to bradycardia  - Holter monitor in the office showed underlying sinus rhythm rates 44-93 bpm frequent VEs beats  939 pairs and 93 runs. The longest run 21 beats  @ 183 bpm. There was 1126 bigeminy and 103 quadrigeminy   - refer NM Myocardial to assess for any ischemia or infarct   - EP eval for possible need for ablation  - if nothing else from EP standpoint can be discharged and follow up with Dr. Deleon in the outpatient     Elisa Ayoub D.O. [facility]  Cardiology/Vascular Cardiology -[facility] Cardiology   Telephone # [phone]

## 2022-07-06 NOTE — H&P ADULT - HISTORY OF PRESENT ILLNESS
78 y/o male with PMH of HTN, HLD, hypothyroidism was sent to the ED by PCP. Patient noted palpitation few weeks ago while laying down; discussed with his PCP who advised echo. Echo done earlier today, got a call from PCP saying echo showed irregular heart beat and asked him to come to the ED. Patient has no chest pain, shortness of breath, diaphoresis, nausea, vomiting, abdominal pain, change in bowel/urinary habit, fever, chills, recent travel.

## 2022-07-06 NOTE — H&P ADULT - NSICDXPASTMEDICALHX_GEN_ALL_CORE_FT
PAST MEDICAL HISTORY:  History of hypothyroidism     HLD (hyperlipidemia)     HTN (hypertension)

## 2022-07-06 NOTE — ED ADULT TRIAGE NOTE - CHIEF COMPLAINT QUOTE
had echo today which showed irregular heart rate, sent in by doctor for eval. denies chest pain/diff breathing.

## 2022-07-06 NOTE — ED ADULT NURSE NOTE - SUICIDE SCREENING QUESTION 3
CPT Code for Cologard in the pamphlet given to the pt is 34267.   Pt states her insurance has never heard of this test.   Will call them with the info. Encouraged to rtc if any further questions.     No

## 2022-07-06 NOTE — ED ADULT TRIAGE NOTE - PRO INTERPRETER NEED 2
Vaccine Information Statement(s) was given today. This has been reviewed, questions answered, and verbal consent given by Parent for injection(s) and administration of Haemophilus Influenza type b (Hib), Pediarix, Pneumococcal Conjugate (PCV13) and Rotavirus.    Patient tolerated without incident. See immunization grid for documentation.     English

## 2022-07-06 NOTE — ED PROVIDER NOTE - PROGRESS NOTE DETAILS
Hafsa GUERRERO for ED attending, Dr. Coley, called [facility] [place] on call, waiting on call back. Hafsa GUERRERO for ED attending, Dr. Allen, spoke with Dr. jones he states that pt has been having runs of nsvt and he recommends pt to be admitted for ischemic workup telemetry and he let his np know the pt is here in er. Pt has been made aware currently awaiting labs to then admit the pt.

## 2022-07-06 NOTE — ED PROVIDER NOTE - CARE PLAN
Principal Discharge DX:	Nonsustained ventricular tachycardia  Secondary Diagnosis:	Renal insufficiency   1

## 2022-07-06 NOTE — H&P ADULT - NSHPPHYSICALEXAM_GEN_ALL_CORE
Vital Signs Last 24 Hrs  T(C): 36.8 (06 Jul 2022 18:07), Max: 36.8 (06 Jul 2022 18:07)  T(F): 98.3 (06 Jul 2022 18:07), Max: 98.3 (06 Jul 2022 18:07)  HR: 48 (06 Jul 2022 23:58) (47 - 48)  BP: 186/85 (06 Jul 2022 23:58) (153/- - 186/85)  BP(mean): --  RR: 18 (06 Jul 2022 23:58) (18 - 18)  SpO2: 100% (06 Jul 2022 23:58) (98% - 100%)

## 2022-07-06 NOTE — H&P ADULT - ASSESSMENT
78 y/o male with PMH of HTN, HLD, hypothyroidism was sent to the ED by PCP for evaluation of abnormal echo done outpatient. In the ED, patient was bradycardiac.     Cardiac arrythmia  Admit to telemetry   Bradycardia HR: in the 48 on telemetry   Patient asymptomatic   Patient had echo done today outpatient, please obtain record in AM   Cardiology on board   Will hold Diltiazem 240mg due to bradycardia     CKD-3  Cr mildly elevated (see rob GAMBINO)   Will hold HCTZ 12.5mg   Continue Losartan 100mg   Monitor renal function     HTN/HLD  Atorvastatin 20mg   Losartan 100mg   HCTZ 12.5mg and Diltiazem 240mg on hold   Monitor BP     Hypothyroidism   Synthroid 75mcg     Supportive   DVT prophylaxis: Heparin sc  Diet: DASH     Plan of care discussed with patient

## 2022-07-06 NOTE — ED PROVIDER NOTE - OBJECTIVE STATEMENT
77y male pt with pmhx of  presents to ED c/o irregular heartbeat. Pt states a couple months ago he felt a racing sensation while he was laying in bed, states he came to ed but had normal ekg. States he had an echo today which was abnormal, showing extra heartbeats so he was referred to ED for further evaluation. Pt states he was on a monitor at home for 24 hours, taken off this morning which was normal as well. States he feels otherwise normal.   denies nausea, vomiting, myalgia, other medical complaints

## 2022-07-06 NOTE — ED ADULT NURSE NOTE - OBJECTIVE STATEMENT
Patient A&Ox4, complains of palpitations intermittently for past 3 weeks. Patient was told by PCP to go to Emergency Department. Patient states he had an ECHO today with his cardiologist. Patient placed on cardiac monitor. Respirations even and unlabored, VSS, no distress noted. Patient denies chest pain, sob, dizziness, or nausea.

## 2022-07-07 ENCOUNTER — TRANSCRIPTION ENCOUNTER (OUTPATIENT)
Age: 77
End: 2022-07-07

## 2022-07-07 VITALS
HEART RATE: 57 BPM | TEMPERATURE: 98 F | SYSTOLIC BLOOD PRESSURE: 144 MMHG | DIASTOLIC BLOOD PRESSURE: 87 MMHG | RESPIRATION RATE: 17 BRPM | OXYGEN SATURATION: 98 %

## 2022-07-07 LAB
ANION GAP SERPL CALC-SCNC: 11 MMOL/L — SIGNIFICANT CHANGE UP (ref 5–17)
BUN SERPL-MCNC: 22.9 MG/DL — HIGH (ref 8–20)
CALCIUM SERPL-MCNC: 8.9 MG/DL — SIGNIFICANT CHANGE UP (ref 8.6–10.2)
CHLORIDE SERPL-SCNC: 108 MMOL/L — HIGH (ref 98–107)
CO2 SERPL-SCNC: 25 MMOL/L — SIGNIFICANT CHANGE UP (ref 22–29)
CREAT SERPL-MCNC: 2.1 MG/DL — HIGH (ref 0.5–1.3)
EGFR: 32 ML/MIN/1.73M2 — LOW
FLUAV AG NPH QL: SIGNIFICANT CHANGE UP
FLUBV AG NPH QL: SIGNIFICANT CHANGE UP
GLUCOSE SERPL-MCNC: 93 MG/DL — SIGNIFICANT CHANGE UP (ref 70–99)
POTASSIUM SERPL-MCNC: 4.1 MMOL/L — SIGNIFICANT CHANGE UP (ref 3.5–5.3)
POTASSIUM SERPL-SCNC: 4.1 MMOL/L — SIGNIFICANT CHANGE UP (ref 3.5–5.3)
RSV RNA NPH QL NAA+NON-PROBE: SIGNIFICANT CHANGE UP
SARS-COV-2 RNA SPEC QL NAA+PROBE: SIGNIFICANT CHANGE UP
SODIUM SERPL-SCNC: 144 MMOL/L — SIGNIFICANT CHANGE UP (ref 135–145)

## 2022-07-07 PROCEDURE — 93018 CV STRESS TEST I&R ONLY: CPT

## 2022-07-07 PROCEDURE — 99239 HOSP IP/OBS DSCHRG MGMT >30: CPT

## 2022-07-07 PROCEDURE — 93005 ELECTROCARDIOGRAM TRACING: CPT

## 2022-07-07 PROCEDURE — 84480 ASSAY TRIIODOTHYRONINE (T3): CPT

## 2022-07-07 PROCEDURE — 99221 1ST HOSP IP/OBS SF/LOW 40: CPT

## 2022-07-07 PROCEDURE — 84436 ASSAY OF TOTAL THYROXINE: CPT

## 2022-07-07 PROCEDURE — 99222 1ST HOSP IP/OBS MODERATE 55: CPT

## 2022-07-07 PROCEDURE — 71046 X-RAY EXAM CHEST 2 VIEWS: CPT

## 2022-07-07 PROCEDURE — 86703 HIV-1/HIV-2 1 RESULT ANTBDY: CPT

## 2022-07-07 PROCEDURE — 80053 COMPREHEN METABOLIC PANEL: CPT

## 2022-07-07 PROCEDURE — A9500: CPT

## 2022-07-07 PROCEDURE — 99285 EMERGENCY DEPT VISIT HI MDM: CPT | Mod: 25

## 2022-07-07 PROCEDURE — 80048 BASIC METABOLIC PNL TOTAL CA: CPT

## 2022-07-07 PROCEDURE — 78452 HT MUSCLE IMAGE SPECT MULT: CPT | Mod: 26

## 2022-07-07 PROCEDURE — 36415 COLL VENOUS BLD VENIPUNCTURE: CPT

## 2022-07-07 PROCEDURE — 93016 CV STRESS TEST SUPVJ ONLY: CPT

## 2022-07-07 PROCEDURE — 83735 ASSAY OF MAGNESIUM: CPT

## 2022-07-07 PROCEDURE — 84443 ASSAY THYROID STIM HORMONE: CPT

## 2022-07-07 PROCEDURE — 85025 COMPLETE CBC W/AUTO DIFF WBC: CPT

## 2022-07-07 PROCEDURE — 87637 SARSCOV2&INF A&B&RSV AMP PRB: CPT

## 2022-07-07 PROCEDURE — 78452 HT MUSCLE IMAGE SPECT MULT: CPT

## 2022-07-07 PROCEDURE — 84484 ASSAY OF TROPONIN QUANT: CPT

## 2022-07-07 PROCEDURE — 93017 CV STRESS TEST TRACING ONLY: CPT

## 2022-07-07 RX ORDER — LOSARTAN POTASSIUM 100 MG/1
100 TABLET, FILM COATED ORAL DAILY
Refills: 0 | Status: DISCONTINUED | OUTPATIENT
Start: 2022-07-07 | End: 2022-07-07

## 2022-07-07 RX ORDER — ACETAMINOPHEN 500 MG
650 TABLET ORAL EVERY 6 HOURS
Refills: 0 | Status: DISCONTINUED | OUTPATIENT
Start: 2022-07-07 | End: 2022-07-07

## 2022-07-07 RX ORDER — HYDRALAZINE HCL 50 MG
25 TABLET ORAL ONCE
Refills: 0 | Status: COMPLETED | OUTPATIENT
Start: 2022-07-07 | End: 2022-07-07

## 2022-07-07 RX ORDER — DILTIAZEM HCL 120 MG
1 CAPSULE, EXT RELEASE 24 HR ORAL
Qty: 0 | Refills: 0 | DISCHARGE

## 2022-07-07 RX ORDER — LANOLIN ALCOHOL/MO/W.PET/CERES
3 CREAM (GRAM) TOPICAL AT BEDTIME
Refills: 0 | Status: DISCONTINUED | OUTPATIENT
Start: 2022-07-07 | End: 2022-07-07

## 2022-07-07 RX ORDER — LEVOTHYROXINE SODIUM 125 MCG
75 TABLET ORAL DAILY
Refills: 0 | Status: DISCONTINUED | OUTPATIENT
Start: 2022-07-07 | End: 2022-07-07

## 2022-07-07 RX ORDER — METOPROLOL TARTRATE 50 MG
1 TABLET ORAL
Qty: 30 | Refills: 0
Start: 2022-07-07 | End: 2022-08-05

## 2022-07-07 RX ORDER — HEPARIN SODIUM 5000 [USP'U]/ML
5000 INJECTION INTRAVENOUS; SUBCUTANEOUS EVERY 8 HOURS
Refills: 0 | Status: DISCONTINUED | OUTPATIENT
Start: 2022-07-07 | End: 2022-07-07

## 2022-07-07 RX ORDER — ONDANSETRON 8 MG/1
4 TABLET, FILM COATED ORAL EVERY 8 HOURS
Refills: 0 | Status: DISCONTINUED | OUTPATIENT
Start: 2022-07-07 | End: 2022-07-07

## 2022-07-07 RX ORDER — ATORVASTATIN CALCIUM 80 MG/1
20 TABLET, FILM COATED ORAL AT BEDTIME
Refills: 0 | Status: DISCONTINUED | OUTPATIENT
Start: 2022-07-07 | End: 2022-07-07

## 2022-07-07 RX ADMIN — HEPARIN SODIUM 5000 UNIT(S): 5000 INJECTION INTRAVENOUS; SUBCUTANEOUS at 17:03

## 2022-07-07 RX ADMIN — ATORVASTATIN CALCIUM 20 MILLIGRAM(S): 80 TABLET, FILM COATED ORAL at 20:15

## 2022-07-07 RX ADMIN — HEPARIN SODIUM 5000 UNIT(S): 5000 INJECTION INTRAVENOUS; SUBCUTANEOUS at 06:08

## 2022-07-07 RX ADMIN — Medication 75 MICROGRAM(S): at 06:07

## 2022-07-07 RX ADMIN — Medication 25 MILLIGRAM(S): at 01:13

## 2022-07-07 RX ADMIN — LOSARTAN POTASSIUM 100 MILLIGRAM(S): 100 TABLET, FILM COATED ORAL at 06:08

## 2022-07-07 NOTE — CONSULT NOTE ADULT - ASSESSMENT
78 y/o male h/o of HTN, HLD, hypothyroidism and palpitation for the past several weeks. He underwent ambulatory monitor and echo; Holter monitor reportedly demonstrated an episode of 21 beats WCT and pt was advised to come to [facility] ED for evaluation. On presentation he was found to be in sinus rhythm at 50s bpm with borderline [place] (200-220bpm) with frequent PVCs. Tele since admission demonstrates sinus rhythm 50s - 60s with intact conduction and frequent PVCs. EP is consulted for sinus bradycardia, PVCs and NSVT.    Recommendations:   1. Sinus bradycardia  - average 50s - 60s bpm w/ reasonable chronotropic response reported on holter (report/strips not available for writer review)  -    2. Frequent PVCs, NSVT  -  76 y/o male h/o of HTN, HLD, hypothyroidism and palpitation for the past several weeks. He underwent ambulatory monitor and echo; Holter monitor reportedly demonstrated an episode of 21 beats WCT and pt was advised to come to [facility] ED for evaluation. On presentation he was found to be in sinus rhythm at 50s bpm with borderline [place] (200-220bpm) with frequent PVCs. Tele since admission demonstrates sinus rhythm 50s - 60s with intact conduction and frequent PVCs. EP is consulted for sinus bradycardia, PVCs and NSVT.    Recommendations:   1. Sinus bradycardia  - average 50s - 60s bpm w/ reasonable chronotropic response reported on holter.  -     2. Frequent PVCs, NSVT  - NST results pending.  78 y/o male h/o of HTN, HLD, hypothyroidism and palpitation for the past several weeks. He underwent ambulatory monitor and echo; Holter monitor reportedly demonstrated an episode of 21 beats WCT and pt was advised to come to [facility] ED for evaluation. On presentation he was found to be in sinus rhythm at 50s bpm with borderline [place] (200-220bpm) with frequent PVCs. Tele since admission demonstrates sinus rhythm 50s - 60s with intact conduction and frequent PVCs. EP is consulted for sinus bradycardia, PVCs and NSVT.    Recommendations:   1. Sinus bradycardia  - average 50s - 60s bpm w/ reasonable chronotropic response on holter  - metoprolol stopped 2-3 weeks ago due to palpitation    2. Frequent PVCs, NSVT  - Suspect idiopathic ventricular ectopy, but cannot rule out underlying ischemia  - NST results pending. If no evidence of ischemia, will plan for outpatient follow up to assess need for ablation  - Would consider restarting low dose beta blocker.     Will d/w Dr. Garcia; further recs to follow.      76 y/o male h/o of HTN, HLD, hypothyroidism and palpitation for the past several weeks. He underwent ambulatory monitor and echo; Holter monitor reportedly demonstrated an episode of 21 beats WCT and pt was advised to come to [facility] ED for evaluation. On presentation he was found to be in sinus rhythm at 50s bpm with borderline [place] (200-220bpm) with frequent PVCs. Tele since admission demonstrates sinus rhythm 50s - 60s with intact conduction and frequent PVCs. EP is consulted for sinus bradycardia, PVCs and NSVT.    Recommendations:   1. Sinus bradycardia  - average 50s - 60s bpm w/ reasonable chronotropic response on holter  - metoprolol stopped 2-3 weeks ago due to palpitation    2. Frequent PVCs, NSVT  - Suspect idiopathic ventricular ectopy, but cannot rule out underlying ischemia  - NST results pending. If no evidence of ischemia, will plan for outpatient follow up to assess need for ablation  - Consider cardiac MRI - can be performed as outpatient.   - Would consider restarting low dose beta blocker.     Will d/w Dr. Garcia; further recs to follow.

## 2022-07-07 NOTE — DISCHARGE NOTE PROVIDER - CARE PROVIDER_API CALL
Noe Garcia)  Cardiac Electrophysiology; Cardiovascular Disease; Internal Medicine  [address] [place] [id number]  Phone: [phone]  Fax: [phone]  Follow Up Time:

## 2022-07-07 NOTE — DISCHARGE NOTE PROVIDER - NSDCMRMEDTOKEN_GEN_ALL_CORE_FT
atorvastatin 20 mg oral tablet: 1 tab(s) orally once a day  levothyroxine 75 mcg (0.075 mg) oral capsule: 1 cap(s) orally once a day  losartan 100 mg oral tablet: 1 tab(s) orally once a day  Metoprolol Succinate ER 25 mg oral tablet, extended release: 1 tab(s) orally once a day

## 2022-07-07 NOTE — PROGRESS NOTE ADULT - SUBJECTIVE AND OBJECTIVE BOX
Patient is a 77y old  Male who presents with a chief complaint of cardiac arrhythmia    Patient seen and examined at bedside.     ALLERGIES:  No Known Allergies    MEDICATIONS  (STANDING):  atorvastatin 20 milliGRAM(s) Oral at bedtime  heparin   Injectable 5000 Unit(s) SubCutaneous every 8 hours  levothyroxine 75 MICROGram(s) Oral daily  losartan 100 milliGRAM(s) Oral daily    MEDICATIONS  (PRN):  acetaminophen     Tablet .. 650 milliGRAM(s) Oral every 6 hours PRN Temp greater or equal to 38C (100.4F), Mild Pain (1 - 3)  aluminum hydroxide/magnesium hydroxide/simethicone Suspension 30 milliLiter(s) Oral every 4 hours PRN Dyspepsia  melatonin 3 milliGRAM(s) Oral at bedtime PRN Insomnia  ondansetron Injectable 4 milliGRAM(s) IV Push every 8 hours PRN Nausea and/or Vomiting    Vital Signs Last 24 Hrs  T(F): 97.5 (07 Jul 2022 07:29), Max: 98.3 (06 Jul 2022 18:07)  HR: 56 (07 Jul 2022 07:29) (47 - 56)  BP: 169/81 (07 Jul 2022 07:29) (149/73 - 186/85)  RR: 18 (07 Jul 2022 07:29) (18 - 18)  SpO2: 99% (07 Jul 2022 07:29) (98% - 100%)  I&O's Summary    PHYSICAL EXAM:  General: NAD, A/O x 3  ENT: MMM, no thrush  Neck: Supple, No JVD  Lungs: Clear to auscultation bilaterally, good air entry, non-labored breathing  Cardio: +s1/s2  Abdomen: Soft, Nontender, Nondistended; Bowel sounds present  Extremities: No calf tenderness, No pitting edema    LABS:                        14.2   4.30  )-----------( 203      ( 06 Jul 2022 21:06 )             40.8     07-07    144  |  108  |  22.9  ----------------------------<  93  4.1   |  25.0  |  2.10    Ca    8.9      07 Jul 2022 04:16  Mg     2.3     07-06    TPro  6.6  /  Alb  4.0  /  TBili  0.2  /  DBili  x   /  AST  23  /  ALT  14  /  AlkPhos  65  07-06    TSH 3.49   TSH with FT4 reflex --  Total T3 70    RADIOLOGY & ADDITIONAL TESTS:  - no new tests

## 2022-07-07 NOTE — PROGRESS NOTE ADULT - ASSESSMENT
78 y/o male with PMH of HTN, HLD, hypothyroidism was sent to the ED by PCP for evaluation of abnormal echo done outpatient. In the ED, patient was bradycardiac.     #Cardiac arrythmia  - sent to hospital by [facility]  - echo 7/6/2022 at [facility] LVEF 50% grade 1 diastolic dysfunction, moderate mitral regurg, moder aortic regurg, moderate pulm regurg  - cardio consult appreciated  - EP consult pending   - echo report placed in scan folder in ED   - npo for now     #CKD-3  - monitor cr   - avoid nephrotoxic meds    #HTN- Essential  - monitor blood pressure  - losartan    #HLD  - statin    #Hypothyroidism   - Synthroid     #DVT prophylaxis  - Heparin sc    spoke to patient sister on patient phone

## 2022-07-07 NOTE — DISCHARGE NOTE PROVIDER - NSDCCPCAREPLAN_GEN_ALL_CORE_FT
PRINCIPAL DISCHARGE DIAGNOSIS  Diagnosis: Cardiac arrhythmia  Assessment and Plan of Treatment: - take medications as rx  - follow up with ep cardiology in 2 weeks

## 2022-07-07 NOTE — CONSULT NOTE ADULT - NS ATTEND AMEND GEN_ALL_CORE FT
Seen and examined, agree with above unless specified below. No prior syncope or presyncope. He stated that his mother dies of a heart attack at age 62 but she also had enlarged heart. No other FH of SCD    EKG, SR with 1 st degree AVB. Normal QTc  NST negative for ischemia    - NSVT, non-sustained and with mild symptoms.   - PVC, burden 20% of 24 hours HM which can over or under-estimate PVC burden  - Palpitation  - SB    Asymptomatic from SB, so can resume metoprolol at 25 mg daily  No further inpatient EP intervention  Follow up with me in 2 weeks with one week MCOT.   Counseled to seek urgent medical attention if ever had syncope or presyncope or limiting symptoms    above d/w pt, cardiology and primary team

## 2022-07-07 NOTE — PATIENT PROFILE ADULT - FALL HARM RISK - TYPE OF ASSESSMENT
Alert-The patient is alert, awake and responds to voice. The patient is oriented to time, place, and person. The triage nurse is able to obtain subjective information. Admission

## 2022-07-07 NOTE — PATIENT PROFILE ADULT - VISION (WITH CORRECTIVE LENSES IF THE PATIENT USUALLY WEARS THEM):
Tyler Basurto called concerned that her surgery was scheduled at [facility] and someone told her they were out of network. I called her insurance company [facility]) and spoke to Catherine (call SRB#[id number]). She did verify that [facility] is in her network. She also stated that the surgery [id number] does not need us to precert it. We just need to make sure that Dr. Blanka Sahu does a referral for us. I spoke with Dr. Cora Garcia office who will do this referral. I relayed all this to the patient.
Normal vision: sees adequately in most situations; can see medication labels, newsprint

## 2022-07-07 NOTE — CONSULT NOTE ADULT - SUBJECTIVE AND OBJECTIVE BOX
[place] [facility]ARDI[facility]LECTROPHYSIOLOGY  [facility]   Office: [address] [place] [address]  Telephone: [phone] Fax:[phone]      78 y/o male h/o of HTN, HLD, hypothyroidism and palpitation for the past several weeks. He underwent ambulatory monitor and echo; Holter monitor reportedly demonstrated an episode of 21 beats WCT and pt was advised to come to [facility] ED for evaluation. On presentation he was found to be in sinus rhythm at 50s bpm with borderline PA (200-220bpm) with frequent PVCs. Tele since admission demonstrates sinus rhythm 50s - 60s with intact conduction and frequent PVCs. EP is consulted for sinus bradycardia, PVCs and NSVT.       PAST MEDICAL & SURGICAL HISTORY:  HTN (hypertension)  HLD (hyperlipidemia)  History of hypothyroidism          REVIEW OF SYSTEMS:  CONSTITUTIONAL: No fever, weight loss, or fatigue  EYES: No visual disturbances  NECK: No pain or stiffness  RESPIRATORY: No cough, wheezing, chills or hemoptysis; No shortness of breath  CARDIOVASCULAR: see HPI  GASTROINTESTINAL: No abdominal or epigastric pain. No nausea, vomiting, or hematemesis; No diarrhea or constipation. No melena or hematochezia.  NEUROLOGICAL: No headaches, memory loss, loss of strength, numbness, or tremors  SKIN: No itching, burning, rashes, or lesions   LYMPH NODES: No enlarged glands  ENDOCRINE: No heat or cold intolerance; No hair loss  PSYCHIATRIC: No depression, anxiety, mood swings, or difficulty sleeping  HEME/LYMPH: No easy bruising, or bleeding gums      MEDICATIONS  (STANDING):  atorvastatin 20 milliGRAM(s) Oral at bedtime  heparin   Injectable 5000 Unit(s) SubCutaneous every 8 hours  levothyroxine 75 MICROGram(s) Oral daily  losartan 100 milliGRAM(s) Oral daily    MEDICATIONS  (PRN):  acetaminophen     Tablet .. 650 milliGRAM(s) Oral every 6 hours PRN Temp greater or equal to 38C (100.4F), Mild Pain (1 - 3)  aluminum hydroxide/magnesium hydroxide/simethicone Suspension 30 milliLiter(s) Oral every 4 hours PRN Dyspepsia  melatonin 3 milliGRAM(s) Oral at bedtime PRN Insomnia  ondansetron Injectable 4 milliGRAM(s) IV Push every 8 hours PRN Nausea and/or Vomiting      Allergies  No Known Allergies    SOCIAL HISTORY:     FAMILY HISTORY:  FH: CAD (coronary artery disease) (Mother)        Vital Signs Last 24 Hrs  T(C): 36.4 (07 Jul 2022 07:29), Max: 36.8 (06 Jul 2022 18:07)  T(F): 97.5 (07 Jul 2022 07:29), Max: 98.3 (06 Jul 2022 18:07)  HR: 56 (07 Jul 2022 07:29) (47 - 56)  BP: 169/81 (07 Jul 2022 07:29) (149/73 - 186/85)  BP(mean): --  RR: 18 (07 Jul 2022 07:29) (18 - 18)  SpO2: 99% (07 Jul 2022 07:29) (98% - 100%)    Physical Exam:  Constitutional: AAOx3, NAD  Neck: supple, No JVD  Cardiovascular: +S1S2 RRR, no murmurs, rubs, gallops   Pulmonary: CTA b/l, unlabored, no wheezes, rales. rhonci  Abdomen: +BS, soft NTND  Extremities: no edema b/l, +distal pulses b/l  Neuro: non focal, speech clear, MCKEE x 4    LABS:                        14.2   4.30  )-----------( 203      ( 06 Jul 2022 21:06 )             40.8   07-07    144  |  108<H>  |  22.9<H>  ----------------------------<  93  4.1   |  25.0  |  2.10<H>    Ca    8.9      07 Jul 2022 04:16  Mg     2.3     07-06    TPro  6.6  /  Alb  4.0  /  TBili  0.2<L>  /  DBili  x   /  AST  23  /  ALT  14  /  AlkPhos  65  07-06  LIVER FUNCTIONS - ( 06 Jul 2022 21:06 )  Alb: 4.0 g/dL / Pro: 6.6 g/dL / ALK PHOS: 65 U/L / ALT: 14 U/L / AST: 23 U/L / GGT: x           CARDIAC MARKERS ( 06 Jul 2022 21:06 )  x     / <0.01 ng/mL / x     / x     / x          RADIOLOGY & ADDITIONAL STUDIES:  Echo (@[facility]) 7.6.22: LVEF 50-55%, grade I diastolic dysfunction, mildly dilated RA, normal LA, trivial TV regurg, moderate MV regurg, moderate AV regurg, mod PV regurg      Per cardiology note: Holter showed "sinus rhythm with rates from 44 to 93 bpm with 1st degree AV block. Frequent VE beats with 939 pairs and 93 runs. The longest/fastest run 21 beats @ 183 bpm. There was 1126 bigeminy 97 trigeminy and 103 quadrigeminy episode noted. Rare SVE beats noted with 5 pairs." Monitor report/strips not available      [place] [facility]ARDIAC ELECTROPHYSIOLOGY  [facility]   Office: [address] [place] [address]  Telephone: [phone] Fax:[phone]      76 y/o male h/o of HTN, HLD, hypothyroidism and palpitation for the past several weeks. He underwent ambulatory monitor and echo; Holter monitor reportedly demonstrated an episode of 21 beats WCT and pt was advised to come to [facility] ED for evaluation. On presentation he was found to be in sinus rhythm at 50s bpm with borderline HI (200-220bpm) with frequent PVCs. Tele since admission demonstrates sinus rhythm 50s - 60s with intact conduction and frequent PVCs. EP is consulted for sinus bradycardia, PVCs and NSVT.       PAST MEDICAL & SURGICAL HISTORY:  HTN (hypertension)  HLD (hyperlipidemia)  History of hypothyroidism          REVIEW OF SYSTEMS:  CONSTITUTIONAL: No fever, weight loss, or fatigue  EYES: No visual disturbances  NECK: No pain or stiffness  RESPIRATORY: No cough, wheezing, chills or hemoptysis; No shortness of breath  CARDIOVASCULAR: see HPI  GASTROINTESTINAL: No abdominal or epigastric pain. No nausea, vomiting, or hematemesis; No diarrhea or constipation. No melena or hematochezia.  NEUROLOGICAL: No headaches, memory loss, loss of strength, numbness, or tremors  SKIN: No itching, burning, rashes, or lesions   LYMPH NODES: No enlarged glands  ENDOCRINE: No heat or cold intolerance; No hair loss  PSYCHIATRIC: No depression, anxiety, mood swings, or difficulty sleeping  HEME/LYMPH: No easy bruising, or bleeding gums      MEDICATIONS  (STANDING):  atorvastatin 20 milliGRAM(s) Oral at bedtime  heparin   Injectable 5000 Unit(s) SubCutaneous every 8 hours  levothyroxine 75 MICROGram(s) Oral daily  losartan 100 milliGRAM(s) Oral daily    MEDICATIONS  (PRN):  acetaminophen     Tablet .. 650 milliGRAM(s) Oral every 6 hours PRN Temp greater or equal to 38C (100.4F), Mild Pain (1 - 3)  aluminum hydroxide/magnesium hydroxide/simethicone Suspension 30 milliLiter(s) Oral every 4 hours PRN Dyspepsia  melatonin 3 milliGRAM(s) Oral at bedtime PRN Insomnia  ondansetron Injectable 4 milliGRAM(s) IV Push every 8 hours PRN Nausea and/or Vomiting      Allergies  No Known Allergies    SOCIAL HISTORY:     FAMILY HISTORY:  FH: CAD (coronary artery disease) (Mother)        Vital Signs Last 24 Hrs  T(C): 36.4 (07 Jul 2022 07:29), Max: 36.8 (06 Jul 2022 18:07)  T(F): 97.5 (07 Jul 2022 07:29), Max: 98.3 (06 Jul 2022 18:07)  HR: 56 (07 Jul 2022 07:29) (47 - 56)  BP: 169/81 (07 Jul 2022 07:29) (149/73 - 186/85)  BP(mean): --  RR: 18 (07 Jul 2022 07:29) (18 - 18)  SpO2: 99% (07 Jul 2022 07:29) (98% - 100%)    Physical Exam:  Constitutional: AAOx3, NAD  Neck: supple, No JVD  Cardiovascular: +S1S2 RRR, no murmurs, rubs, gallops   Pulmonary: CTA b/l, unlabored, no wheezes, rales. rhonci  Abdomen: +BS, soft NTND  Extremities: no edema b/l, +distal pulses b/l  Neuro: non focal, speech clear, MCKEE x 4    LABS:                        14.2   4.30  )-----------( 203      ( 06 Jul 2022 21:06 )             40.8   07-07    144  |  108<H>  |  22.9<H>  ----------------------------<  93  4.1   |  25.0  |  2.10<H>    Ca    8.9      07 Jul 2022 04:16  Mg     2.3     07-06    TPro  6.6  /  Alb  4.0  /  TBili  0.2<L>  /  DBili  x   /  AST  23  /  ALT  14  /  AlkPhos  65  07-06  LIVER FUNCTIONS - ( 06 Jul 2022 21:06 )  Alb: 4.0 g/dL / Pro: 6.6 g/dL / ALK PHOS: 65 U/L / ALT: 14 U/L / AST: 23 U/L / GGT: x           CARDIAC MARKERS ( 06 Jul 2022 21:06 )  x     / <0.01 ng/mL / x     / x     / x          RADIOLOGY & ADDITIONAL STUDIES:  Echo (@[facility]) 7.6.22: LVEF 50-55%, grade I diastolic dysfunction, mildly dilated RA, normal LA, trivial TV regurg, moderate MV regurg, moderate AV regurg, mod PV regurg      Holter 7/5 - 7/6/22 sinus rhythm at 44 to 93 bpm (mean 56 bpm) with borderline 1st degree AV block. Frequent PVCs; 939 pairs, 93 runs - longest 21 beats @ max 183 bpm. Rare APCs.    [place] [facility]ARDIAC ELECTROPHYSIOLOGY  [facility]   Office: [address] [place] [address]  Telephone: [phone] Fax:[phone]      78 y/o male h/o of HTN, HLD, hypothyroidism and palpitation, described as his heart pounding when laying down, for the past several weeks. He underwent ambulatory monitor and echo; Holter monitor reportedly demonstrated an episode of 21 beats WCT and pt was advised to come [facility] ED for evaluation. On presentation he was found to be in sinus rhythm at 50s bpm with borderline HI (200-220bpm) with frequent PVCs. Tele since admission demonstrates sinus rhythm 50s - 60s with intact conduction and frequent PVCs. Pt notes he was previously on metoprolol, which was stopped when he first complained of palpitation. EP is consulted for sinus bradycardia, PVCs and NSVT. Patient currently denies chest pain, shortness of breath at rest, dizziness, near/true syncope, fevers/chills, N/V/D, or other cardiac or constitutional symptoms.       PAST MEDICAL & SURGICAL HISTORY:  HTN (hypertension)  HLD (hyperlipidemia)  History of hypothyroidism      REVIEW OF SYSTEMS:  CONSTITUTIONAL: No fever, weight loss, or fatigue  EYES: No visual disturbances  NECK: No pain or stiffness  RESPIRATORY: No cough, wheezing, chills or hemoptysis; No shortness of breath  CARDIOVASCULAR: see HPI  GASTROINTESTINAL: No abdominal or epigastric pain. No nausea, vomiting, or hematemesis; No diarrhea or constipation. No melena or hematochezia.  NEUROLOGICAL: No headaches, memory loss, loss of strength, numbness, or tremors  SKIN: No itching, burning, rashes, or lesions   LYMPH NODES: No enlarged glands  ENDOCRINE: No heat or cold intolerance; No hair loss  PSYCHIATRIC: No depression, anxiety, mood swings, or difficulty sleeping  HEME/LYMPH: No easy bruising, or bleeding gums      MEDICATIONS  (STANDING):  atorvastatin 20 milliGRAM(s) Oral at bedtime  heparin   Injectable 5000 Unit(s) SubCutaneous every 8 hours  levothyroxine 75 MICROGram(s) Oral daily  losartan 100 milliGRAM(s) Oral daily    MEDICATIONS  (PRN):  acetaminophen     Tablet .. 650 milliGRAM(s) Oral every 6 hours PRN Temp greater or equal to 38C (100.4F), Mild Pain (1 - 3)  aluminum hydroxide/magnesium hydroxide/simethicone Suspension 30 milliLiter(s) Oral every 4 hours PRN Dyspepsia  melatonin 3 milliGRAM(s) Oral at bedtime PRN Insomnia  ondansetron Injectable 4 milliGRAM(s) IV Push every 8 hours PRN Nausea and/or Vomiting      Allergies  No Known Allergies    SOCIAL HISTORY:     FAMILY HISTORY:  FH: CAD (coronary artery disease) (Mother)        Vital Signs Last 24 Hrs  T(C): 36.4 (07 Jul 2022 07:29), Max: 36.8 (06 Jul 2022 18:07)  T(F): 97.5 (07 Jul 2022 07:29), Max: 98.3 (06 Jul 2022 18:07)  HR: 56 (07 Jul 2022 07:29) (47 - 56)  BP: 169/81 (07 Jul 2022 07:29) (149/73 - 186/85)  BP(mean): --  RR: 18 (07 Jul 2022 07:29) (18 - 18)  SpO2: 99% (07 Jul 2022 07:29) (98% - 100%)    Physical Exam:  Constitutional: AAOx3, NAD  Neck: supple, No JVD  Cardiovascular: +S1S2 RRR, no murmurs, rubs, gallops   Pulmonary: CTA b/l, unlabored, no wheezes, rales. rhonci  Abdomen: +BS, soft NTND  Extremities: no edema b/l, +distal pulses b/l  Neuro: non focal, speech clear, MCKEE x 4    LABS:                        14.2   4.30  )-----------( 203      ( 06 Jul 2022 21:06 )             40.8   07-07    144  |  108<H>  |  22.9<H>  ----------------------------<  93  4.1   |  25.0  |  2.10<H>    Ca    8.9      07 Jul 2022 04:16  Mg     2.3     07-06    TPro  6.6  /  Alb  4.0  /  TBili  0.2<L>  /  DBili  x   /  AST  23  /  ALT  14  /  AlkPhos  65  07-06  LIVER FUNCTIONS - ( 06 Jul 2022 21:06 )  Alb: 4.0 g/dL / Pro: 6.6 g/dL / ALK PHOS: 65 U/L / ALT: 14 U/L / AST: 23 U/L / GGT: x           CARDIAC MARKERS ( 06 Jul 2022 21:06 )  x     / <0.01 ng/mL / x     / x     / x          RADIOLOGY & ADDITIONAL STUDIES:  Echo (@[facility]) 7.6.22: LVEF 50-55%, grade I diastolic dysfunction, mildly dilated RA, normal LA, trivial TV regurg, moderate MV regurg, moderate AV regurg, mod PV regurg      Holter 7/5 - 7/6/22 sinus rhythm at 44 to 93 bpm (mean 56 bpm) with borderline 1st degree AV block. Frequent PVCs; 939 pairs, 93 runs - longest 21 beats @ max 183 bpm. Rare APCs.    [place] [facility]ARDI[facility] ELECTROPHYSIOLOGY  [facility] Faculty [facility]   Office: [address] [place] [address]  Telephone: [phone] Fax:[phone]      76 y/o male h/o of HTN, HLD, hypothyroidism and palpitation, described as his heart pounding when laying down, for the past several weeks. He underwent ambulatory monitor and echo; Holter monitor reportedly demonstrated an episode of 21 beats WCT and pt was advised to come to [facility] ED for evaluation. On presentation he was found to be in sinus rhythm at 50s bpm with borderline AL (200-220bpm) with frequent PVCs. Tele since admission demonstrates sinus rhythm 50s - 60s with intact conduction and frequent PVCs. Pt notes he was previously on metoprolol, which was stopped when he first complained of palpitation. EP is consulted for sinus bradycardia, PVCs and NSVT. Patient currently denies chest pain, shortness of breath at rest, dizziness, near/true syncope, fevers/chills, N/V/D, or other cardiac or constitutional symptoms.       PAST MEDICAL & SURGICAL HISTORY:  HTN (hypertension)  HLD (hyperlipidemia)  History of hypothyroidism      REVIEW OF SYSTEMS:  CONSTITUTIONAL: No fever, weight loss, or fatigue  EYES: No visual disturbances  NECK: No pain or stiffness  RESPIRATORY: No cough, wheezing, chills or hemoptysis; No shortness of breath  CARDIOVASCULAR: see HPI  GASTROINTESTINAL: No abdominal or epigastric pain. No nausea, vomiting, or hematemesis; No diarrhea or constipation. No melena or hematochezia.  NEUROLOGICAL: No headaches, memory loss, loss of strength, numbness, or tremors  SKIN: No itching, burning, rashes, or lesions   LYMPH NODES: No enlarged glands  ENDOCRINE: No heat or cold intolerance; No hair loss  PSYCHIATRIC: No depression, anxiety, mood swings, or difficulty sleeping  HEME/LYMPH: No easy bruising, or bleeding gums      MEDICATIONS  (STANDING):  atorvastatin 20 milliGRAM(s) Oral at bedtime  heparin   Injectable 5000 Unit(s) SubCutaneous every 8 hours  levothyroxine 75 MICROGram(s) Oral daily  losartan 100 milliGRAM(s) Oral daily    MEDICATIONS  (PRN):  acetaminophen     Tablet .. 650 milliGRAM(s) Oral every 6 hours PRN Temp greater or equal to 38C (100.4F), Mild Pain (1 - 3)  aluminum hydroxide/magnesium hydroxide/simethicone Suspension 30 milliLiter(s) Oral every 4 hours PRN Dyspepsia  melatonin 3 milliGRAM(s) Oral at bedtime PRN Insomnia  ondansetron Injectable 4 milliGRAM(s) IV Push every 8 hours PRN Nausea and/or Vomiting      Allergies  No Known Allergies    SOCIAL HISTORY: social ETOH; denies tobacco or illicit drugs    FAMILY HISTORY:  FH: mother  at age 62 - cardiomyopathy/HF        Vital Signs Last 24 Hrs  T(C): 36.4 (2022 07:29), Max: 36.8 (2022 18:07)  T(F): 97.5 (2022 07:29), Max: 98.3 (2022 18:07)  HR: 56 (2022 07:29) (47 - 56)  BP: 169/81 (2022 07:29) (149/73 - 186/85)  BP(mean): --  RR: 18 (2022 07:29) (18 - 18)  SpO2: 99% (2022 07:29) (98% - 100%)    Physical Exam:  Constitutional: AAOx3, NAD  Neck: supple, No JVD  Cardiovascular: +S1S2 RRR, no murmurs, rubs, gallops   Pulmonary: CTA b/l, unlabored, no wheezes, rales. rhonci  Abdomen: +BS, soft NTND  Extremities: no edema b/l, +distal pulses b/l  Neuro: non focal, speech clear, MCKEE x 4    LABS:                        14.2   4.30  )-----------( 203      ( 2022 21:06 )             40.8   07-07    144  |  108<H>  |  22.9<H>  ----------------------------<  93  4.1   |  25.0  |  2.10<H>    Ca    8.9      2022 04:16  Mg     2.3     07-06    TPro  6.6  /  Alb  4.0  /  TBili  0.2<L>  /  DBili  x   /  AST  23  /  ALT  14  /  AlkPhos  65  07-06  LIVER FUNCTIONS - ( 2022 21:06 )  Alb: 4.0 g/dL / Pro: 6.6 g/dL / ALK PHOS: 65 U/L / ALT: 14 U/L / AST: 23 U/L / GGT: x           CARDIAC MARKERS ( 2022 21:06 )  x     / <0.01 ng/mL / x     / x     / x          RADIOLOGY & ADDITIONAL STUDIES:  Echo (@[facility]) 7.6.22: LVEF 50-55%, grade I diastolic dysfunction, mildly dilated RA, normal LA, trivial TV regurg, moderate MV regurg, moderate AV regurg, mod PV regurg      Holter  - 22 sinus rhythm at 44 to 93 bpm (mean 56 bpm) with borderline 1st degree AV block. Frequent PVCs; 939 pairs, 93 runs - longest 21 beats @ max 183 bpm. Rare APCs.

## 2022-07-07 NOTE — DISCHARGE NOTE NURSING/CASE MANAGEMENT/SOCIAL WORK - NSDCPEFALRISK_GEN_ALL_CORE
For information on Fall & Injury Prevention, visit: [email or web address] OR  [email or web address] OR  https://www.cdc.gov/steadi/patient.html

## 2022-07-07 NOTE — DISCHARGE NOTE PROVIDER - HOSPITAL COURSE
78 y/o male with PMH of HTN, HLD, hypothyroidism was sent to the ED by PCP. Patient noted palpitation few weeks ago while laying down; discussed with his PCP who advised echo. Echo done earlier today, got a call from PCP saying echo showed irregular heart beat and asked him to come to the ED. patient seen by ep cardiology while in hospital and s/p stress test. medications adjusted by ep cardiology and now being discharged in stable condition.

## 2022-07-07 NOTE — DISCHARGE NOTE NURSING/CASE MANAGEMENT/SOCIAL WORK - PATIENT PORTAL LINK FT
You can access the FollowMyHealth Patient Portal offered by [facility] by registering at the following website: [email or web address]. By joining [facility]’s FollowMyHealth portal, you will also be able to view your health information using other applications (apps) compatible with our system.

## 2022-07-07 NOTE — DISCHARGE NOTE PROVIDER - ATTENDING DISCHARGE PHYSICAL EXAMINATION:
General: NAD, A/O x 3  ENT: MMM, no thrush  Neck: Supple, No JVD  Lungs: Clear to auscultation bilaterally, good air entry, non-labored breathing  Cardio: +s1/s2  Abdomen: Soft, Nontender, Nondistended; Bowel sounds present  Extremities: No calf tenderness, No pitting edema

## 2022-07-08 PROBLEM — I10 ESSENTIAL (PRIMARY) HYPERTENSION: Chronic | Status: ACTIVE | Noted: 2022-07-07

## 2022-07-08 PROBLEM — Z86.39 PERSONAL HISTORY OF OTHER ENDOCRINE, NUTRITIONAL AND METABOLIC DISEASE: Chronic | Status: ACTIVE | Noted: 2022-07-07

## 2022-07-08 PROBLEM — E78.5 HYPERLIPIDEMIA, UNSPECIFIED: Chronic | Status: ACTIVE | Noted: 2022-07-07

## 2022-07-18 VITALS — SYSTOLIC BLOOD PRESSURE: 148 MMHG | DIASTOLIC BLOOD PRESSURE: 76 MMHG

## 2022-07-25 ENCOUNTER — NON-APPOINTMENT (OUTPATIENT)
Age: 77
End: 2022-07-25

## 2022-07-25 ENCOUNTER — APPOINTMENT (OUTPATIENT)
Dept: ELECTROPHYSIOLOGY | Facility: CLINIC | Age: 77
End: 2022-07-25

## 2022-07-25 VITALS
DIASTOLIC BLOOD PRESSURE: 71 MMHG | HEIGHT: 68 IN | HEART RATE: 52 BPM | SYSTOLIC BLOOD PRESSURE: 133 MMHG | OXYGEN SATURATION: 97 % | BODY MASS INDEX: 26.07 KG/M2 | TEMPERATURE: 97.9 F | WEIGHT: 172 LBS

## 2022-07-25 DIAGNOSIS — I10 ESSENTIAL (PRIMARY) HYPERTENSION: ICD-10-CM

## 2022-07-25 DIAGNOSIS — I47.2 VENTRICULAR TACHYCARDIA: ICD-10-CM

## 2022-07-25 DIAGNOSIS — Z82.49 FAMILY HISTORY OF ISCHEMIC HEART DISEASE AND OTHER DISEASES OF THE CIRCULATORY SYSTEM: ICD-10-CM

## 2022-07-25 DIAGNOSIS — Z86.39 PERSONAL HISTORY OF OTHER ENDOCRINE, NUTRITIONAL AND METABOLIC DISEASE: ICD-10-CM

## 2022-07-25 PROCEDURE — 93000 ELECTROCARDIOGRAM COMPLETE: CPT

## 2022-07-25 PROCEDURE — 99215 OFFICE O/P EST HI 40 MIN: CPT | Mod: 25

## 2022-07-25 RX ORDER — LOSARTAN POTASSIUM 100 MG/1
100 TABLET, FILM COATED ORAL DAILY
Qty: 90 | Refills: 2 | Status: ACTIVE | COMMUNITY
Start: 2022-07-25

## 2022-07-25 RX ORDER — CALCITRIOL 0.25 UG/1
0.25 CAPSULE, LIQUID FILLED ORAL DAILY
Refills: 0 | Status: ACTIVE | COMMUNITY

## 2022-07-25 RX ORDER — METOPROLOL TARTRATE 50 MG/1
50 TABLET, FILM COATED ORAL
Refills: 0 | Status: DISCONTINUED | COMMUNITY
End: 2022-07-25

## 2022-07-25 RX ORDER — VALSARTAN 320 MG/1
320 TABLET, COATED ORAL
Refills: 0 | Status: DISCONTINUED | COMMUNITY
End: 2022-07-25

## 2022-07-25 RX ORDER — METOPROLOL SUCCINATE 25 MG/1
25 TABLET, EXTENDED RELEASE ORAL
Qty: 90 | Refills: 1 | Status: ACTIVE | COMMUNITY
Start: 2022-07-25

## 2022-07-25 RX ORDER — DILTIAZEM HYDROCHLORIDE 240 MG/1
240 CAPSULE, EXTENDED RELEASE ORAL
Refills: 0 | Status: DISCONTINUED | COMMUNITY
End: 2022-07-25

## 2022-07-25 RX ORDER — LEVOTHYROXINE SODIUM 0.07 MG/1
75 TABLET ORAL DAILY
Refills: 0 | Status: ACTIVE | COMMUNITY

## 2022-07-25 RX ORDER — TADALAFIL 20 MG/1
20 TABLET, FILM COATED ORAL
Qty: 10 | Refills: 5 | Status: ACTIVE | COMMUNITY

## 2022-07-25 NOTE — PHYSICAL EXAM
[Well Developed] : well developed [Well Nourished] : well nourished [No Acute Distress] : no acute distress [Normal Venous Pressure] : normal venous pressure [Normal S1, S2] : normal S1, S2 [No Murmur] : no murmur [No Gallop] : no gallop [Clear Lung Fields] : clear lung fields [Good Air Entry] : good air entry [No Respiratory Distress] : no respiratory distress  [Soft] : abdomen soft [Non Tender] : non-tender [Normal Gait] : normal gait [No Edema] : no edema [No Rash] : no rash [Moves all extremities] : moves all extremities [No Focal Deficits] : no focal deficits [Normal Speech] : normal speech [Alert and Oriented] : alert and oriented [Normal memory] : normal memory

## 2022-07-25 NOTE — REASON FOR VISIT
[Arrhythmia/ECG Abnorrmalities] : arrhythmia/ECG abnormalities [FreeTextEntry3] : Dr. Lamonte Reyes @ [facility]

## 2022-07-25 NOTE — ASSESSMENT
[FreeTextEntry1] : This is a 76 y/o male h/o of HTN, HLD, hypothyroidism and palpitation, described as his heart pounding when laying down, for several weeks. He saw Dr. Reyes @ [facility] on 6/2022 and was noted to be in sinus bradycardia with 1st degree AVB. At that time, he was on metoprolol plus diltiazem. His metoprolol was stopped and he continued on diltiazem 240 mg daily and underwent a 24 hours Holter monitor on diltiazem and one week after stopping metoprolol. Also underwent an echo there which showed preserved EF. The Holter monitor demonstrated an episode of 21 beats WCT and he was advised to come to [facility] ED for evaluation. On presentation he was found to be in sinus rhythm at 50s bpm with borderline NV (200-220bpm) with frequent PVCs. Tele since admission demonstrates sinus rhythm 50s - 60s with intact conduction and frequent PVCs. \par \par EP is consulted for sinus bradycardia, PVCs and NSVT. He underwent a NST while in hospital which showed normal perfusion and normal EF. TSH was normal while in hospital. We started him on metoprolol 25 mg daily and arranged for one week [facility]OT and outpatient follow up. He is here for follow up. He continues to have palpitation but they are less severe compared to prior last admission. He denies any fevers, chills, cough, sweats, chest pain, dyspnea on exertion, orthopnea, paroxysmal nocturnal dyspnea, peripheral edema, lightheadedness, dizziness, syncope, nausea, vomiting, melena, hematochezia, or hematemesis.\par \par He wore the monitor and sent it back 2 days ago.

## 2022-07-25 NOTE — HISTORY OF PRESENT ILLNESS
[FreeTextEntry1] : This is a 78 y/o male h/o of HTN, HLD, hypothyroidism and palpitation, described as his heart pounding when laying down, for several weeks. He saw Dr. Reyes @ [facility] on 6/2022 and was noted to be in sinus bradycardia with 1st degree AVB. At that time, he was on metoprolol plus diltiazem. His metoprolol was stopped and he continued on diltiazem 240 mg daily and underwent a 24 hours Holter monitor on diltiazem and one week after stopping metoprolol. Also underwent an echo there which showed preserved EF. The Holter monitor demonstrated an episode of 21 beats WCT and he was advised to come to [facility] ED for evaluation. On presentation he was found to be in sinus rhythm at 50s bpm with borderline RI (200-220bpm) with frequent PVCs. Tele since admission demonstrates sinus rhythm 50s - 60s with intact conduction and frequent PVCs. \par \par EP is consulted for sinus bradycardia, PVCs and NSVT. He underwent a NST while in hospital which showed normal perfusion and normal EF. TSH was normal while in hospital. We started him on metoprolol 25 mg daily and arranged for one week [facility]OT and outpatient follow up. He is here for follow up. He continues to have palpitation but they are less severe compared to prior last admission. He denies any fevers, chills, cough, sweats, chest pain, dyspnea on exertion, orthopnea, paroxysmal nocturnal dyspnea, peripheral edema, lightheadedness, dizziness, syncope, nausea, vomiting, melena, hematochezia, or hematemesis.\par \par He wore the monitor and sent it back 2 days ago. \par \par Social history:\par Negative for smoking, illicit drugs or alcohol abuse.\par \par Family Hx:\par He stated that his mother dies of a heart attack at age 62 but she also had enlarged heart. No other FH of SCD\par \par \par TESTS:\par EKG 72/5/2022: SR with PVCs. PVC morphology is RBBB-like with superior axis. Normal QTc\par \par Echo (@[facility]) 7.6.22: LVEF 50-55%, grade I diastolic dysfunction, mildly dilated RA, normal LA, trivial TV regurg, moderate MV regurg, moderate AV regurg, mod PV regurg\par \par Holter 7/5 - 7/6/22 sinus rhythm at 44 to 93 bpm (mean 56 bpm) with borderline 1st degree AV block. Frequent PVCs; 939 pairs, 93 runs - longest 21 beats @ max 183 bpm. Rare APCs.\par \par NST [facility] 7/7/2022.\par IMPRESSIONS:\par * Myocardial Perfusion SPECT results are normal.\par * Normal myocardial perfusion scan, with no evidence of infarction or inducible ischemia.\par * Post-stress resting myocardial perfusion gated SPECT imaging was performed (LVEF = 67 %;LVEDV = 92 ml.), normal global LV systolic function.\par * Frequent PVCs, ventricular bigeminy/trigeminy and ventricular couplets during rest/stress.

## 2022-07-25 NOTE — DISCUSSION/SUMMARY
[FreeTextEntry1] : 1. PVCs and NSVTs. He has preserved EF, normal perfusion, and no malignant personal or family history. He is feeling a bit better on low dose metoprolol (25 mg daily). Its not very clear to me now if he was or was not on diltiazem prior to last ED visit. Its mentioned on [facility] notes and Dr. Reyes's note but there was no clear plan to stop it and he is not on it now. Nonetheless, we called his pharmacy and confirmed the current list. We had a very long discussion about nature, prognosis and management options of symptomatic PVCs/NSVTs. He wont be a good candidate for up-titration of meds or for AAT given SB. Will likely benefit from PVC ablation. He is keen on this, which we will likely plan soon after cardiac MRI\par - Cardiac MRI to screen for subtle cardiomyopathy/LGE given non RVOT PVCs\par - c/w Toprol 25 mg daily. No diltiazem for now\par - Follow up on MCOT\par \par 2. SB, asymptomatic. No current indication for PPM. Reasonable HR trend on last monitor. \par \par Will follow up in one month and will likely plan PVC ablation then. \par

## 2022-07-25 NOTE — REVIEW OF SYSTEMS
[Palpitations] : palpitations [Fever] : no fever [Chills] : no chills [SOB] : no shortness of breath [Dyspnea on exertion] : not dyspnea during exertion [Chest Discomfort] : no chest discomfort [Leg Claudication] : no intermittent leg claudication [Orthopnea] : no orthopnea [Syncope] : no syncope [Cough] : no cough [Wheezing] : no wheezing [Nausea] : no nausea [Vomiting] : no vomiting [Diarrhea] : diarrhea [Dizziness] : no dizziness

## 2022-08-01 ENCOUNTER — OUTPATIENT (OUTPATIENT)
Dept: OUTPATIENT SERVICES | Facility: HOSPITAL | Age: 77
LOS: 1 days | End: 2022-08-01

## 2022-08-01 DIAGNOSIS — I49.3 VENTRICULAR PREMATURE DEPOLARIZATION: ICD-10-CM

## 2022-08-08 ENCOUNTER — APPOINTMENT (OUTPATIENT)
Dept: MRI IMAGING | Facility: CLINIC | Age: 77
End: 2022-08-08

## 2022-08-08 ENCOUNTER — OUTPATIENT (OUTPATIENT)
Dept: OUTPATIENT SERVICES | Facility: HOSPITAL | Age: 77
LOS: 1 days | End: 2022-08-08

## 2022-08-08 DIAGNOSIS — I49.3 VENTRICULAR PREMATURE DEPOLARIZATION: ICD-10-CM

## 2022-08-08 PROCEDURE — 75561 CARDIAC MRI FOR MORPH W/DYE: CPT | Mod: 26

## 2022-08-22 ENCOUNTER — APPOINTMENT (OUTPATIENT)
Dept: ELECTROPHYSIOLOGY | Facility: CLINIC | Age: 77
End: 2022-08-22

## 2022-08-22 VITALS
OXYGEN SATURATION: 100 % | DIASTOLIC BLOOD PRESSURE: 80 MMHG | BODY MASS INDEX: 26.37 KG/M2 | HEART RATE: 50 BPM | SYSTOLIC BLOOD PRESSURE: 144 MMHG | WEIGHT: 174 LBS | TEMPERATURE: 97.5 F | HEIGHT: 68 IN

## 2022-08-22 PROCEDURE — 93000 ELECTROCARDIOGRAM COMPLETE: CPT

## 2022-08-22 PROCEDURE — 99215 OFFICE O/P EST HI 40 MIN: CPT | Mod: 25

## 2022-08-22 NOTE — DISCUSSION/SUMMARY
[FreeTextEntry1] : 1. PVCs and NSVTs. He has preserved EF, normal perfusion, unremarkable cardiac MRI and no malignant personal or family history. 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 We discussed that these are generally benign arrhythmias. They can however result in cardiomyopathy if the burden is high and can significantly impact a patient's quality of life.\par The options for management of iodopathic VT/PVCs were discussed including watchful waiting with serial echocardiograms ([id number], [id number] and then yearly), medications (including possibility of antiarrhythmic drugs) and invasive options such as catheter ablation.\par The risks, benefits, and alternatives of catheter ablation were discussed at length. The risks explained include, but are not limited to: pain, bleeding, infection, radiation-induced skin irritation and/or burning, vascular injury and/or thrombosis, pneumothorax, cardiac perforation and/or tamponade, valvular injury, need for permanent pacemaker, death, heart, stroke, or need for open heart surgery. The opportunity to ask questions was provided and all were answered to the patient's satisfaction.\par At this juncture, he likes to proceed with catheter ablation. \par - PVC ablation on current medications. Will likely use short term AC after if extensive LV ablation performed. \par - c/w Toprol 25 mg daily. No diltiazem for now\par \par 2. SB, asymptomatic. No current indication for PPM. Reasonable HR trend on last monitor.

## 2022-08-22 NOTE — REASON FOR VISIT
[Arrhythmia/ECG Abnorrmalities] : arrhythmia/ECG abnormalities [FreeTextEntry3] : Dr. Lamonte Reyes @ [facility]

## 2022-08-22 NOTE — ASSESSMENT
[FreeTextEntry1] : This is a 76 y/o male h/o of HTN, HLD, hypothyroidism and palpitation, described as his heart pounding when laying down, for several weeks. He saw Dr. Reyes @ [facility] on 6/2022 and was noted to be in sinus bradycardia with 1st degree AVB. At that time, he was on metoprolol plus diltiazem. His metoprolol was stopped and he continued on diltiazem 240 mg daily and underwent a 24 hours Holter monitor on diltiazem and one week after stopping metoprolol. Also underwent an echo there which showed preserved EF. The Holter monitor demonstrated an episode of 21 beats WCT and he was advised to come to [facility] ED for evaluation. On presentation he was found to be in sinus rhythm at 50s bpm with borderline WV (200-220bpm) with frequent PVCs. Tele since admission demonstrates sinus rhythm 50s - 60s with intact conduction and frequent PVCs. \par \par EP is consulted for sinus bradycardia, PVCs and NSVT. He underwent a NST while in hospital which showed normal perfusion and normal EF. TSH was normal while in hospital. We started him on metoprolol 25 mg daily and arranged for one week MCOT and outpatient follow up. He is here for follow up. He continues to have palpitation but they are less severe compared to prior last admission. He denies any fevers, chills, cough, sweats, chest pain, dyspnea on exertion, orthopnea, paroxysmal nocturnal dyspnea, peripheral edema, lightheadedness, dizziness, syncope, nausea, vomiting, melena, hematochezia, or hematemesis.\par \par He wore the monitor and sent it back 2 days ago. \par \par HPI 8/22/2022. Above from last visit on 7/25/2022. Since then, he had a cardiac MRI which showed normal LV/RV and EF and no scar on LGE. His event monitor (on current meds) reported 7% PVCs. He has been compliant with medications and continues to feel very bothersome palpitation. No CP, SOB, syncope, presyncope, dizziness, or bleeding.

## 2022-08-22 NOTE — HISTORY OF PRESENT ILLNESS
[FreeTextEntry1] : This is a 76 y/o male h/o of HTN, HLD, hypothyroidism and palpitation, described as his heart pounding when laying down, for several weeks. He saw Dr. Reyes @ [facility] on 6/2022 and was noted to be in sinus bradycardia with 1st degree AVB. At that time, he was on metoprolol plus diltiazem. His metoprolol was stopped and he continued on diltiazem 240 mg daily and underwent a 24 hours Holter monitor on diltiazem and one week after stopping metoprolol. Also underwent an echo there which showed preserved EF. The Holter monitor demonstrated an episode of 21 beats WCT and he was advised to come to [facility] ED for evaluation. On presentation he was found to be in sinus rhythm at 50s bpm with borderline NM (200-220bpm) with frequent PVCs. Tele since admission demonstrates sinus rhythm 50s - 60s with intact conduction and frequent PVCs. \par \par EP is consulted for sinus bradycardia, PVCs and NSVT. He underwent a NST while in hospital which showed normal perfusion and normal EF. TSH was normal while in hospital. We started him on metoprolol 25 mg daily and arranged for one week MCOT and outpatient follow up. He is here for follow up. He continues to have palpitation but they are less severe compared to prior last admission. He denies any fevers, chills, cough, sweats, chest pain, dyspnea on exertion, orthopnea, paroxysmal nocturnal dyspnea, peripheral edema, lightheadedness, dizziness, syncope, nausea, vomiting, melena, hematochezia, or hematemesis.\par \par He wore the monitor and sent it back 2 days ago. \par \par HPI 8/22/2022. Above from last visit on 7/25/2022. Since then, he had a cardiac MRI which showed normal LV/RV and EF and no scar on LGE. His event monitor (on current meds) reported 7% PVCs. He has been compliant with medications and continues to feel very bothersome palpitation. No CP, SOB, syncope, presyncope, dizziness, or bleeding.\par \par Social history:\par Negative for smoking, illicit drugs or alcohol abuse.\par \par Family Hx:\par He stated that his mother dies of a heart attack at age 62 but she also had enlarged heart. No other FH of SCD\par \par \par TESTS:\par EKG  8/22/2022.  SB @ 59 bpm and 1st degree AVB\par EKG  7/25/2022: SR with PVCs. PVC morphology is RBBB-like with superior axis. Normal QTc\par \par Cardiac MRI: Normal RV/LV, and LVEF with no LGE. \par \par Echo (@[facility]) 7.6.22: LVEF 50-55%, grade I diastolic dysfunction, mildly dilated RA, normal LA, trivial TV regurg, moderate MV regurg, moderate AV regurg, mod PV regurg\par \par Holter 7/5 - 7/6/22 sinus rhythm at 44 to 93 bpm (mean 56 bpm) with borderline 1st degree AV block. Frequent PVCs; 939 pairs, 93 runs - longest 21 beats @ max 183 bpm. Rare APCs.\par \par NST [facility] 7/7/2022.\par IMPRESSIONS:\par * Myocardial Perfusion SPECT results are normal.\par * Normal myocardial perfusion scan, with no evidence of infarction or inducible ischemia.\par * Post-stress resting myocardial perfusion gated SPECT imaging was performed (LVEF = 67 %;LVEDV = 92 ml.), normal global LV systolic function.\par * Frequent PVCs, ventricular bigeminy/trigeminy and ventricular couplets during rest/stress.

## 2022-09-09 ENCOUNTER — OUTPATIENT (OUTPATIENT)
Dept: OUTPATIENT SERVICES | Facility: HOSPITAL | Age: 77
LOS: 1 days | End: 2022-09-09
Payer: COMMERCIAL

## 2022-09-09 VITALS
RESPIRATION RATE: 16 BRPM | DIASTOLIC BLOOD PRESSURE: 62 MMHG | TEMPERATURE: 96 F | SYSTOLIC BLOOD PRESSURE: 144 MMHG | HEART RATE: 53 BPM | OXYGEN SATURATION: 100 % | WEIGHT: 170.86 LBS | HEIGHT: 69 IN

## 2022-09-09 DIAGNOSIS — Z29.9 ENCOUNTER FOR PROPHYLACTIC MEASURES, UNSPECIFIED: ICD-10-CM

## 2022-09-09 DIAGNOSIS — I47.2 VENTRICULAR TACHYCARDIA: ICD-10-CM

## 2022-09-09 DIAGNOSIS — Z98.890 OTHER SPECIFIED POSTPROCEDURAL STATES: Chronic | ICD-10-CM

## 2022-09-09 DIAGNOSIS — Z90.49 ACQUIRED ABSENCE OF OTHER SPECIFIED PARTS OF DIGESTIVE TRACT: Chronic | ICD-10-CM

## 2022-09-09 DIAGNOSIS — G47.33 OBSTRUCTIVE SLEEP APNEA (ADULT) (PEDIATRIC): ICD-10-CM

## 2022-09-09 DIAGNOSIS — Z01.818 ENCOUNTER FOR OTHER PREPROCEDURAL EXAMINATION: ICD-10-CM

## 2022-09-09 DIAGNOSIS — I49.3 VENTRICULAR PREMATURE DEPOLARIZATION: ICD-10-CM

## 2022-09-09 DIAGNOSIS — E03.9 HYPOTHYROIDISM, UNSPECIFIED: ICD-10-CM

## 2022-09-09 DIAGNOSIS — I10 ESSENTIAL (PRIMARY) HYPERTENSION: ICD-10-CM

## 2022-09-09 LAB
ANION GAP SERPL CALC-SCNC: 12 MMOL/L — SIGNIFICANT CHANGE UP (ref 5–17)
APTT BLD: 28 SEC — SIGNIFICANT CHANGE UP (ref 27.5–35.5)
BASOPHILS # BLD AUTO: 0.05 K/UL — SIGNIFICANT CHANGE UP (ref 0–0.2)
BASOPHILS NFR BLD AUTO: 1.2 % — SIGNIFICANT CHANGE UP (ref 0–2)
BLD GP AB SCN SERPL QL: SIGNIFICANT CHANGE UP
BUN SERPL-MCNC: 26.9 MG/DL — HIGH (ref 8–20)
CALCIUM SERPL-MCNC: 9.5 MG/DL — SIGNIFICANT CHANGE UP (ref 8.4–10.5)
CHLORIDE SERPL-SCNC: 104 MMOL/L — SIGNIFICANT CHANGE UP (ref 98–107)
CO2 SERPL-SCNC: 25 MMOL/L — SIGNIFICANT CHANGE UP (ref 22–29)
CREAT SERPL-MCNC: 2.05 MG/DL — HIGH (ref 0.5–1.3)
EGFR: 33 ML/MIN/1.73M2 — LOW
EOSINOPHIL # BLD AUTO: 0.2 K/UL — SIGNIFICANT CHANGE UP (ref 0–0.5)
EOSINOPHIL NFR BLD AUTO: 4.8 % — SIGNIFICANT CHANGE UP (ref 0–6)
GLUCOSE SERPL-MCNC: 94 MG/DL — SIGNIFICANT CHANGE UP (ref 70–99)
HCT VFR BLD CALC: 46 % — SIGNIFICANT CHANGE UP (ref 39–50)
HGB BLD-MCNC: 15.9 G/DL — SIGNIFICANT CHANGE UP (ref 13–17)
IMM GRANULOCYTES NFR BLD AUTO: 0.2 % — SIGNIFICANT CHANGE UP (ref 0–1.5)
INR BLD: 0.97 RATIO — SIGNIFICANT CHANGE UP (ref 0.88–1.16)
LYMPHOCYTES # BLD AUTO: 1.04 K/UL — SIGNIFICANT CHANGE UP (ref 1–3.3)
LYMPHOCYTES # BLD AUTO: 24.8 % — SIGNIFICANT CHANGE UP (ref 13–44)
MAGNESIUM SERPL-MCNC: 2.2 MG/DL — SIGNIFICANT CHANGE UP (ref 1.6–2.6)
MCHC RBC-ENTMCNC: 34.6 GM/DL — SIGNIFICANT CHANGE UP (ref 32–36)
MCHC RBC-ENTMCNC: 34.9 PG — HIGH (ref 27–34)
MCV RBC AUTO: 101.1 FL — HIGH (ref 80–100)
MONOCYTES # BLD AUTO: 0.41 K/UL — SIGNIFICANT CHANGE UP (ref 0–0.9)
MONOCYTES NFR BLD AUTO: 9.8 % — SIGNIFICANT CHANGE UP (ref 2–14)
NEUTROPHILS # BLD AUTO: 2.49 K/UL — SIGNIFICANT CHANGE UP (ref 1.8–7.4)
NEUTROPHILS NFR BLD AUTO: 59.2 % — SIGNIFICANT CHANGE UP (ref 43–77)
PLATELET # BLD AUTO: 213 K/UL — SIGNIFICANT CHANGE UP (ref 150–400)
POTASSIUM SERPL-MCNC: 4.3 MMOL/L — SIGNIFICANT CHANGE UP (ref 3.5–5.3)
POTASSIUM SERPL-SCNC: 4.3 MMOL/L — SIGNIFICANT CHANGE UP (ref 3.5–5.3)
PROTHROM AB SERPL-ACNC: 11.2 SEC — SIGNIFICANT CHANGE UP (ref 10.5–13.4)
RBC # BLD: 4.55 M/UL — SIGNIFICANT CHANGE UP (ref 4.2–5.8)
RBC # FLD: 13.4 % — SIGNIFICANT CHANGE UP (ref 10.3–14.5)
SARS-COV-2 RNA SPEC QL NAA+PROBE: SIGNIFICANT CHANGE UP
SODIUM SERPL-SCNC: 140 MMOL/L — SIGNIFICANT CHANGE UP (ref 135–145)
T3 SERPL-MCNC: 79 NG/DL — LOW (ref 80–200)
T4 AB SER-ACNC: 7 UG/DL — SIGNIFICANT CHANGE UP (ref 4.5–12)
TSH SERPL-MCNC: 2.76 UIU/ML — SIGNIFICANT CHANGE UP (ref 0.27–4.2)
WBC # BLD: 4.2 K/UL — SIGNIFICANT CHANGE UP (ref 3.8–10.5)
WBC # FLD AUTO: 4.2 K/UL — SIGNIFICANT CHANGE UP (ref 3.8–10.5)

## 2022-09-09 PROCEDURE — 93010 ELECTROCARDIOGRAM REPORT: CPT

## 2022-09-09 PROCEDURE — 93005 ELECTROCARDIOGRAM TRACING: CPT

## 2022-09-09 NOTE — H&P PST ADULT - ASSESSMENT
CAPRINI SCORE    AGE RELATED RISK FACTORS                                                             [ ] Age 41-60 years                                            (1 Point)  [ ] Age: 61-74 years                                           (2 Points)                 [ ] Age= 75 years                                                (3 Points)             DISEASE RELATED RISK FACTORS                                                       [ ] Edema in the lower extremities                 (1 Point)                     [ ] Varicose veins                                               (1 Point)                                 [ ] BMI > 25 Kg/m2                                            (1 Point)                                  [ ] Serious infection (ie PNA)                            (1 Point)                     [ ] Lung disease ( COPD, Emphysema)            (1 Point)                                                                          [ ] Acute myocardial infarction                         (1 Point)                  [ ] Congestive heart failure (in the previous month)  (1 Point)         [ ] Inflammatory bowel disease                            (1 Point)                  [ ] Central venous access, PICC or Port               (2 points)       (within the last month)                                                                [ ] Stroke (in the previous month)                        (5 Points)    [ ] Previous or present malignancy                       (2 points)                                                                                                                                                         HEMATOLOGY RELATED FACTORS                                                         [ ] Prior episodes of VTE                                     (3 Points)                     [ ] Positive family history for VTE                      (3 Points)                  [ ] Prothrombin 40830 A                                     (3 Points)                     [ ] Factor V Leiden                                                (3 Points)                        [ ] Lupus anticoagulants                                      (3 Points)                                                           [ ] Anticardiolipin antibodies                              (3 Points)                                                       [ ] High homocysteine in the blood                   (3 Points)                                             [ ] Other congenital or acquired thrombophilia      (3 Points)                                                [ ] Heparin induced thrombocytopenia                  (3 Points)                                        MOBILITY RELATED FACTORS  [ ] Bed rest                                                         (1 Point)  [ ] Plaster cast                                                    (2 points)  [ ] Bed bound for more than 72 hours           (2 Points)    GENDER SPECIFIC FACTORS  [ ] Pregnancy or had a baby within the last month   (1 Point)  [ ] Post-partum < 6 weeks                                   (1 Point)  [ ] Hormonal therapy  or oral contraception   (1 Point)  [ ] History of pregnancy complications              (1 point)  [ ] Unexplained or recurrent              (1 Point)    OTHER RISK FACTORS                                           (1 Point)  [ ] BMI >40, smoking, diabetes requiring insulin, chemotherapy  blood transfusions and length of surgery over 2 hours    SURGERY RELATED RISK FACTORS  [ ]  Section within the last month     (1 Point)  [ ] Minor surgery                                                  (1 Point)  [ ] Arthroscopic surgery                                       (2 Points)  [ ] Planned major surgery lasting more            (2 Points)      than 45 minutes     [ ] Elective hip or knee joint replacement       (5 points)       surgery                                                TRAUMA RELATED RISK FACTORS  [ ] Fracture of the hip, pelvis, or leg                       (5 Points)  [ ] Spinal cord injury resulting in paralysis             (5 points)       (in the previous month)    [ ] Paralysis  (less than 1 month)                             (5 Points)  [ ] Multiple Trauma within 1 month                        (5 Points)    Total Score [        ]    Caprini Score 0-2: Low Risk, NO VTE prophylaxis required for most patients, encourage ambulation  Caprini Score 3-6: Moderate Risk , pharmacologic VTE prophylaxis is indicated for most patients (in the absence of contraindications)  Caprini Score Greater than or =7: High risk, pharmocologic VTE prophylaxis indicated for most patients (in the absence of contraindications)                OPIOID RISK TOOL    BOONE EACH BOX THAT APPLIES AND ADD TOTALS AT THE END    FAMILY HISTORY OF SUBSTANCE ABUSE                 FEMALE         MALE                                                Alcohol                             [  ]1 pt          [  ]3pts                                               Illegal Durgs                     [  ]2 pts        [  ]3pts                                               Rx Drugs                           [  ]4 pts        [  ]4 pts    PERSONAL HISTORY OF SUBSTANCE ABUSE                                                                                          Alcohol                             [  ]3 pts       [  ]3 pts                                               Illegal Durgs                     [  ]4 pts        [  ]4 pts                                               Rx Drugs                           [  ]5 pts        [  ]5 pts    AGE BETWEEN 16-45 YEARS                                      [  ]1 pt         [  ]1 pt    HISTORY OF PREADOLESCENT   SEXUAL ABUSE                                                             [  ]3 pts        [  ]0pts    PSYCHOLOGICAL DISEASE                     ADD, OCD, Bipolar, Schizophrenia        [  ]2 pts         [  ]2 pts                      Depression                                               [  ]1 pt           [  ]1 pt           SCORING TOTAL   (add numbers and type here)              (***)                                     A score of 3 or lower indicated LOW risk for future opiod abuse  A score of 4 to 7 indicated moderate risk for future opiod abuse  A score of 8 or higher indicates a high risk for opiod abuse               CAPRINI SCORE    AGE RELATED RISK FACTORS                                                             [ ] Age 41-60 years                                            (1 Point)  [ ] Age: 61-74 years                                           (2 Points)                 [x ] Age= 75 years                                                (3 Points)             DISEASE RELATED RISK FACTORS                                                       [ ] Edema in the lower extremities                 (1 Point)                     [ ] Varicose veins                                               (1 Point)                                 [ ] BMI > 25 Kg/m2                                            (1 Point)                                  [ ] Serious infection (ie PNA)                            (1 Point)                     [ ] Lung disease ( COPD, Emphysema)            (1 Point)                                                                          [ ] Acute myocardial infarction                         (1 Point)                  [ ] Congestive heart failure (in the previous month)  (1 Point)         [ ] Inflammatory bowel disease                            (1 Point)                  [ ] Central venous access, PICC or Port               (2 points)       (within the last month)                                                                [ ] Stroke (in the previous month)                        (5 Points)    [x ] Previous or present malignancy                       (2 points)                                                                                                                                                         HEMATOLOGY RELATED FACTORS                                                         [ ] Prior episodes of VTE                                     (3 Points)                     [ ] Positive family history for VTE                      (3 Points)                  [ ] Prothrombin 72775 A                                     (3 Points)                     [ ] Factor V Leiden                                                (3 Points)                        [ ] Lupus anticoagulants                                      (3 Points)                                                           [ ] Anticardiolipin antibodies                              (3 Points)                                                       [ ] High homocysteine in the blood                   (3 Points)                                             [ ] Other congenital or acquired thrombophilia      (3 Points)                                                [ ] Heparin induced thrombocytopenia                  (3 Points)                                        MOBILITY RELATED FACTORS  [ ] Bed rest                                                         (1 Point)  [ ] Plaster cast                                                    (2 points)  [ ] Bed bound for more than 72 hours           (2 Points)    GENDER SPECIFIC FACTORS  [ ] Pregnancy or had a baby within the last month   (1 Point)  [ ] Post-partum < 6 weeks                                   (1 Point)  [ ] Hormonal therapy  or oral contraception   (1 Point)  [ ] History of pregnancy complications              (1 point)  [ ] Unexplained or recurrent              (1 Point)    OTHER RISK FACTORS                                           (1 Point)  [ ] BMI >40, smoking, diabetes requiring insulin, chemotherapy  blood transfusions and length of surgery over 2 hours    SURGERY RELATED RISK FACTORS  [ ]  Section within the last month     (1 Point)  [ ] Minor surgery                                                  (1 Point)  [ ] Arthroscopic surgery                                       (2 Points)  [ x] Planned major surgery lasting more            (2 Points)      than 45 minutes     [ ] Elective hip or knee joint replacement       (5 points)       surgery                                                TRAUMA RELATED RISK FACTORS  [ ] Fracture of the hip, pelvis, or leg                       (5 Points)  [ ] Spinal cord injury resulting in paralysis             (5 points)       (in the previous month)    [ ] Paralysis  (less than 1 month)                             (5 Points)  [ ] Multiple Trauma within 1 month                        (5 Points)    Total Score [    7    ]    Caprini Score 0-2: Low Risk, NO VTE prophylaxis required for most patients, encourage ambulation  Caprini Score 3-6: Moderate Risk , pharmacologic VTE prophylaxis is indicated for most patients (in the absence of contraindications)  Caprini Score Greater than or =7: High risk, pharmocologic VTE prophylaxis indicated for most patients (in the absence of contraindications)                OPIOID RISK TOOL    BOONE EACH BOX THAT APPLIES AND ADD TOTALS AT THE END    FAMILY HISTORY OF SUBSTANCE ABUSE                 FEMALE         MALE                                                Alcohol                             [  ]1 pt          [  ]3pts                                               Illegal Durgs                     [  ]2 pts        [  ]3pts                                               Rx Drugs                           [  ]4 pts        [  ]4 pts    PERSONAL HISTORY OF SUBSTANCE ABUSE                                                                                          Alcohol                             [  ]3 pts       [  ]3 pts                                               Illegal Durgs                     [  ]4 pts        [  ]4 pts                                               Rx Drugs                           [  ]5 pts        [  ]5 pts    AGE BETWEEN 16-45 YEARS                                      [  ]1 pt         [  ]1 pt    HISTORY OF PREADOLESCENT   SEXUAL ABUSE                                                             [  ]3 pts        [  ]0pts    PSYCHOLOGICAL DISEASE                     ADD, OCD, Bipolar, Schizophrenia        [  ]2 pts         [  ]2 pts                      Depression                                               [  ]1 pt           [  ]1 pt           SCORING TOTAL   0                                   A score of 3 or lower indicated LOW risk for future opiod abuse  A score of 4 to 7 indicated moderate risk for future opiod abuse  A score of 8 or higher indicates a high risk for opiod abuse      Plan: PVC/Idiopathic VT ablation/carto/anes on 22 with Dr Garcia   Labs pending.   Pre-procedure instructions provided (verbal & written) as follows: Patient educated on preadmission instructions,  and day of procedure medications, verbalizes understanding. Pt instructed to stop vitamins/supplements/herbal medications/NSAIDS for one week prior to surgery and discuss with PMD.   Ablation 2022  - Continue metoprolol    - NPO after midnight prior except meds w/ sips of water.

## 2022-09-09 NOTE — H&P PST ADULT - NSICDXPASTMEDICALHX_GEN_ALL_CORE_FT
PAST MEDICAL HISTORY:  History of hypothyroidism     HLD (hyperlipidemia)     HTN (hypertension)      PAST MEDICAL HISTORY:  Frequent PVCs     History of hypothyroidism     HLD (hyperlipidemia)     HTN (hypertension)     NSVT (nonsustained ventricular tachycardia)

## 2022-09-09 NOTE — H&P PST ADULT - ATTENDING COMMENTS
Patient well known to me. Has symptomatic frequent PVC/VT without structural heart disease or ischemia. Now presents for VA mapping and ablation. All B/R/A discussed. Risks explained and include but not limited to vascular complication, stroke, infeciton, bleeding, cardiac perforation, damage to native conduction system, and death. He provided informed consent.

## 2022-09-09 NOTE — H&P PST ADULT - CARDIOVASCULAR
details… pvcs/S1 S2 present/no gallops/no rub/no pedal edema/irregular rate and rhythm/bradycardia/murmur

## 2022-09-09 NOTE — H&P PST ADULT - HISTORY OF PRESENT ILLNESS
This is a 78 y/o male h/o of HTN, HLD, hypothyroidism and palpitation, described as his heart pounding when laying down, for several weeks. He saw Dr. Reyes @ [facility] on 6/2022 and was noted to be in sinus bradycardia with 1st degree AVB. At that time, he was on metoprolol plus diltiazem. His metoprolol was stopped and he continued on diltiazem 240 mg daily and underwent a 24 hours Holter monitor on diltiazem and one week after stopping metoprolol. Also underwent an echo there which showed preserved EF. The Holter monitor demonstrated an episode of 21 beats WCT and he was advised to come to [facility] ED for evaluation. On presentation he was found to be in sinus rhythm at 50s bpm with borderline ID (200-220bpm) with frequent PVCs. Tele since admission demonstrates sinus rhythm 50s - 60s with intact conduction and frequent PVCs.     EP is consulted for sinus bradycardia, PVCs and NSVT. He underwent a NST while in hospital which showed normal perfusion and normal EF. TSH was normal while in hospital. We started him on metoprolol 25 mg daily and arranged for one week MCOT and outpatient follow up. He is here for follow up. He continues to have palpitation but they are less severe compared to prior last admission. He denies any fevers, chills, cough, sweats, chest pain, dyspnea on exertion, orthopnea, paroxysmal nocturnal dyspnea, peripheral edema, lightheadedness, dizziness, syncope, nausea, vomiting, melena, hematochezia, or hematemesis.    He wore the monitor and sent it back 2 days ago.     HPI 8/22/2022. Above from last visit on 7/25/2022. Since then, he had a cardiac MRI which showed normal LV/RV and EF and no scar on LGE. His event monitor (on current meds) reported 7% PVCs. He has been compliant with medications and continues to feel very bothersome palpitation. No CP, SOB, syncope, presyncope, dizziness, or bleeding.    Social history:  Negative for smoking, illicit drugs or alcohol abuse.    Family Hx:  He stated that his mother dies of a heart attack at age 62 but she also had enlarged heart. No other FH of SCD      TESTS:  EKG 8/22/2022. SB @ 59 bpm and 1st degree AVB  EKG 7/25/2022: SR with PVCs. PVC morphology is RBBB-like with superior axis. Normal QTc    Cardiac MRI: Normal RV/LV, and LVEF with no LGE.     Echo (@[facility]) 7.6.22: LVEF 50-55%, grade I diastolic dysfunction, mildly dilated RA, normal LA, trivial TV regurg, moderate MV regurg, moderate AV regurg, mod PV regurg    Holter 7/5 - 7/6/22 sinus rhythm at 44 to 93 bpm (mean 56 bpm) with borderline 1st degree AV block. Frequent PVCs; 939 pairs, 93 runs - longest 21 beats @ max 183 bpm. Rare APCs.    NST [facility] 7/7/2022.  IMPRESSIONS:  * Myocardial Perfusion SPECT results are normal.  * Normal myocardial perfusion scan, with no evidence of infarction or inducible ischemia.  * Post-stress resting myocardial perfusion gated SPECT imaging was performed (LVEF = 67 %;LVEDV = 92 ml.), normal global LV systolic function.  * Frequent PVCs, ventricular bigeminy/trigeminy and ventricular couplets during rest/stress.      This is a 78 y/o male h/o of HTN, HLD, hypothyroidism and palpitation,  started around April and May  reports palpitations occur when laying down at night  denies relieving or exacerbating factors  denies dizziness, dyspnea on exertion, chest pain, near syncope or syncope    described as his heart pounding when laying down, for several weeks. He saw Dr. Reyes @ [facility] on 6/2022 and was noted to be in sinus bradycardia with 1st degree AVB. At that time, he was on metoprolol plus diltiazem. His metoprolol was stopped and he continued on diltiazem 240 mg daily and underwent a 24 hours Holter monitor on diltiazem and one week after stopping metoprolol. Also underwent an echo there which showed preserved EF. The Holter monitor demonstrated an episode of 21 beats WCT and he was advised to come to [facility] ED for evaluation. On presentation he was found to be in sinus rhythm at 50s bpm with borderline VA (200-220bpm) with frequent PVCs. Tele since admission demonstrates sinus rhythm 50s - 60s with intact conduction and frequent PVCs.     EP is consulted for sinus bradycardia, PVCs and NSVT. He underwent a NST while in hospital which showed normal perfusion and normal EF. TSH was normal while in hospital. We started him on metoprolol 25 mg daily and arranged for one week MCOT and outpatient follow up. He is here for follow up. He continues to have palpitation but they are less severe compared to prior last admission. He denies any fevers, chills, cough, sweats, chest pain, dyspnea on exertion, orthopnea, paroxysmal nocturnal dyspnea, peripheral edema, lightheadedness, dizziness, syncope, nausea, vomiting, melena, hematochezia, or hematemesis.    He wore the monitor and sent it back 2 days ago.     HPI 8/22/2022. Above from last visit on 7/25/2022. Since then, he had a cardiac MRI which showed normal LV/RV and EF and no scar on LGE. His event monitor (on current meds) reported 7% PVCs. He has been compliant with medications and continues to feel very bothersome palpitation. No CP, SOB, syncope, presyncope, dizziness, or bleeding.    Social history:  Negative for smoking, illicit drugs or alcohol abuse.    Family Hx:  He stated that his mother dies of a heart attack at age 62 but she also had enlarged heart. No other FH of SCD      TESTS:  EKG 8/22/2022. SB @ 59 bpm and 1st degree AVB  EKG 7/25/2022: SR with PVCs. PVC morphology is RBBB-like with superior axis. Normal QTc    Cardiac MRI: Normal RV/LV, and LVEF with no LGE.     Echo (@[facility]) 7.6.22: LVEF 50-55%, grade I diastolic dysfunction, mildly dilated RA, normal LA, trivial TV regurg, moderate MV regurg, moderate AV regurg, mod PV regurg    Holter 7/5 - 7/6/22 sinus rhythm at 44 to 93 bpm (mean 56 bpm) with borderline 1st degree AV block. Frequent PVCs; 939 pairs, 93 runs - longest 21 beats @ max 183 bpm. Rare APCs.    NST [facility] 7/7/2022.  IMPRESSIONS:  * Myocardial Perfusion SPECT results are normal.  * Normal myocardial perfusion scan, with no evidence of infarction or inducible ischemia.  * Post-stress resting myocardial perfusion gated SPECT imaging was performed (LVEF = 67 %;LVEDV = 92 ml.), normal global LV systolic function.  * Frequent PVCs, ventricular bigeminy/trigeminy and ventricular couplets during rest/stress.     Patient    This is a 78 y/o male h/o of HTN, HLD, hypothyroidism, NSVT and PVCs. Patient states he started experiencing palpitations in April/May, he saw his cardiologist Dr. Reyes  on 6/2022 and was noted to be in sinus bradycardia with 1st degree AVB    reports palpitations occur when laying down at night  denies relieving or exacerbating factors  denies dizziness, dyspnea on exertion, chest pain, near syncope or syncope    described as his heart pounding when laying down, for several weeks. He saw Dr. Reyes @ [facility] on 6/2022 and was noted to be in sinus bradycardia with 1st degree AVB. At that time, he was on metoprolol plus diltiazem. His metoprolol was stopped and he continued on diltiazem 240 mg daily and underwent a 24 hours Holter monitor on diltiazem and one week after stopping metoprolol. Also underwent an echo there which showed preserved EF. The Holter monitor demonstrated an episode of 21 beats WCT and he was advised to come to [facility] ED for evaluation. On presentation he was found to be in sinus rhythm at 50s bpm with borderline IA (200-220bpm) with frequent PVCs. Tele since admission demonstrates sinus rhythm 50s - 60s with intact conduction and frequent PVCs.     EP is consulted for sinus bradycardia, PVCs and NSVT. He underwent a NST while in hospital which showed normal perfusion and normal EF. TSH was normal while in hospital. We started him on metoprolol 25 mg daily and arranged for one week MCOT and outpatient follow up. He is here for follow up. He continues to have palpitation but they are less severe compared to prior last admission. He denies any fevers, chills, cough, sweats, chest pain, dyspnea on exertion, orthopnea, paroxysmal nocturnal dyspnea, peripheral edema, lightheadedness, dizziness, syncope, nausea, vomiting, melena, hematochezia, or hematemesis.    He wore the monitor and sent it back 2 days ago.     HPI 8/22/2022. Above from last visit on 7/25/2022. Since then, he had a cardiac MRI which showed normal LV/RV and EF and no scar on LGE. His event monitor (on current meds) reported 7% PVCs. He has been compliant with medications and continues to feel very bothersome palpitation. No CP, SOB, syncope, presyncope, dizziness, or bleeding.    Social history:  Negative for smoking, illicit drugs or alcohol abuse.    Family Hx:  He stated that his mother dies of a heart attack at age 62 but she also had enlarged heart. No other FH of SCD      TESTS:  EKG 8/22/2022. SB @ 59 bpm and 1st degree AVB  EKG 7/25/2022: SR with PVCs. PVC morphology is RBBB-like with superior axis. Normal QTc    Cardiac MRI: Normal RV/LV, and LVEF with no LGE.     Echo (@[facility]) 7.6.22: LVEF 50-55%, grade I diastolic dysfunction, mildly dilated RA, normal LA, trivial TV regurg, moderate MV regurg, moderate AV regurg, mod PV regurg    Holter 7/5 - 7/6/22 sinus rhythm at 44 to 93 bpm (mean 56 bpm) with borderline 1st degree AV block. Frequent PVCs; 939 pairs, 93 runs - longest 21 beats @ max 183 bpm. Rare APCs.    NST [facility] 7/7/2022.  IMPRESSIONS:  * Myocardial Perfusion SPECT results are normal.  * Normal myocardial perfusion scan, with no evidence of infarction or inducible ischemia.  * Post-stress resting myocardial perfusion gated SPECT imaging was performed (LVEF = 67 %;LVEDV = 92 ml.), normal global LV systolic function.  * Frequent PVCs, ventricular bigeminy/trigeminy and ventricular couplets during rest/stress.     Patient    This is a 76 y/o male h/o of HTN, HLD, hypothyroidism, NSVT and PVCs. Patient states he started experiencing palpitations in April/May, he saw his cardiologist Dr. Reyes  on 6/2022 and was noted to be in sinus bradycardia with 1st degree AVB.  Patient underwent a 24 hours Holter monitor, which demonstrated an episode of 21 beats WCT and he was advised to come to [facility] ED for evaluation. On presentation he was found to be in sinus rhythm at 50s bpm with borderline VT (200-220bpm) with frequent PVCs. Today at PST he reports palpitations that he notices more when laying down at night.  He denies any fevers, chills, cough, sweats, chest pain, dyspnea on exertion, orthopnea, paroxysmal nocturnal dyspnea, peripheral edema, lightheadedness, dizziness, syncope, nausea, vomiting, melena, hematochezia, or hematemesis. He presents for PVC/VT ablation on 9/13/22       Cardiac MRI: Normal RV/LV, and LVEF with no LGE.     Echo (@[facility]) 7.6.22: LVEF 50-55%, grade I diastolic dysfunction, mildly dilated RA, normal LA, trivial TV regurg, moderate MV regurg, moderate AV regurg, mod PV regurg    Holter 7/5 - 7/6/22 sinus rhythm at 44 to 93 bpm (mean 56 bpm) with borderline 1st degree AV block. Frequent PVCs; 939 pairs, 93 runs - longest 21 beats @ max 183 bpm. Rare APCs.    NST [facility] 7/7/2022.  IMPRESSIONS:  * Myocardial Perfusion SPECT results are normal.  * Normal myocardial perfusion scan, with no evidence of infarction or inducible ischemia.  * Post-stress resting myocardial perfusion gated SPECT imaging was performed (LVEF = 67 %;LVEDV = 92 ml.), normal global LV systolic function.  * Frequent PVCs, ventricular bigeminy/trigeminy and ventricular couplets during rest/stress.        78 y/o male h/o of HTN, HLD, hypothyroidism, NSVT and PVCs. Patient states he started experiencing palpitations in April/May, he saw his cardiologist Dr. Reyes  on 6/2022 and was noted to be in sinus bradycardia with 1st degree AVB.  Patient underwent a 24 hours Holter monitor, which demonstrated an episode of 21 beats WCT and he was advised to come to [facility] ED for evaluation. On presentation he was found to be in sinus rhythm at 50s bpm with borderline CT (200-220bpm) with frequent PVCs. Today at PST he reports palpitations that he notices more when laying down at night.  He denies any fevers, chills, cough, sweats, chest pain, dyspnea on exertion, orthopnea, paroxysmal nocturnal dyspnea, peripheral edema, lightheadedness, dizziness, syncope, nausea, vomiting, melena, hematochezia, or hematemesis. He presents for PVC/VT ablation on 9/13/22 with Dr Garcia.    Cardiac MRI: Normal RV/LV, and LVEF with no LGE.     Echo (@[facility]) 7.6.22: LVEF 50-55%, grade I diastolic dysfunction, mildly dilated RA, normal LA, trivial TV regurg, moderate MV regurg, moderate AV regurg, mod PV regurg    Holter 7/5 - 7/6/22 sinus rhythm at 44 to 93 bpm (mean 56 bpm) with borderline 1st degree AV block. Frequent PVCs; 939 pairs, 93 runs - longest 21 beats @ max 183 bpm. Rare APCs.    NST [facility] 7/7/2022.  IMPRESSIONS:  * Myocardial Perfusion SPECT results are normal.  * Normal myocardial perfusion scan, with no evidence of infarction or inducible ischemia.  * Post-stress resting myocardial perfusion gated SPECT imaging was performed (LVEF = 67 %;LVEDV = 92 ml.), normal global LV systolic function.  * Frequent PVCs, ventricular bigeminy/trigeminy and ventricular couplets during rest/stress.

## 2022-09-13 ENCOUNTER — OUTPATIENT (OUTPATIENT)
Dept: OUTPATIENT SERVICES | Facility: HOSPITAL | Age: 77
LOS: 1 days | End: 2022-09-13
Payer: COMMERCIAL

## 2022-09-13 ENCOUNTER — TRANSCRIPTION ENCOUNTER (OUTPATIENT)
Age: 77
End: 2022-09-13

## 2022-09-13 VITALS
DIASTOLIC BLOOD PRESSURE: 79 MMHG | TEMPERATURE: 98 F | RESPIRATION RATE: 16 BRPM | HEIGHT: 68 IN | OXYGEN SATURATION: 100 % | HEART RATE: 49 BPM | WEIGHT: 175.05 LBS | SYSTOLIC BLOOD PRESSURE: 173 MMHG

## 2022-09-13 VITALS
HEART RATE: 50 BPM | DIASTOLIC BLOOD PRESSURE: 64 MMHG | RESPIRATION RATE: 18 BRPM | OXYGEN SATURATION: 98 % | SYSTOLIC BLOOD PRESSURE: 143 MMHG

## 2022-09-13 DIAGNOSIS — I49.3 VENTRICULAR PREMATURE DEPOLARIZATION: ICD-10-CM

## 2022-09-13 DIAGNOSIS — Z90.49 ACQUIRED ABSENCE OF OTHER SPECIFIED PARTS OF DIGESTIVE TRACT: Chronic | ICD-10-CM

## 2022-09-13 DIAGNOSIS — Z98.890 OTHER SPECIFIED POSTPROCEDURAL STATES: Chronic | ICD-10-CM

## 2022-09-13 DIAGNOSIS — I47.2 VENTRICULAR TACHYCARDIA: ICD-10-CM

## 2022-09-13 LAB
ABO RH CONFIRMATION: SIGNIFICANT CHANGE UP
ANION GAP SERPL CALC-SCNC: 11 MMOL/L — SIGNIFICANT CHANGE UP (ref 5–17)
BUN SERPL-MCNC: 29 MG/DL — HIGH (ref 8–20)
CALCIUM SERPL-MCNC: 9.5 MG/DL — SIGNIFICANT CHANGE UP (ref 8.4–10.5)
CHLORIDE SERPL-SCNC: 103 MMOL/L — SIGNIFICANT CHANGE UP (ref 98–107)
CO2 SERPL-SCNC: 26 MMOL/L — SIGNIFICANT CHANGE UP (ref 22–29)
CREAT SERPL-MCNC: 2.09 MG/DL — HIGH (ref 0.5–1.3)
EGFR: 32 ML/MIN/1.73M2 — LOW
GLUCOSE SERPL-MCNC: 102 MG/DL — HIGH (ref 70–99)
POTASSIUM SERPL-MCNC: 3.9 MMOL/L — SIGNIFICANT CHANGE UP (ref 3.5–5.3)
POTASSIUM SERPL-SCNC: 3.9 MMOL/L — SIGNIFICANT CHANGE UP (ref 3.5–5.3)
SODIUM SERPL-SCNC: 140 MMOL/L — SIGNIFICANT CHANGE UP (ref 135–145)

## 2022-09-13 PROCEDURE — C1894: CPT

## 2022-09-13 PROCEDURE — 93654 COMPRE EP EVAL TX VT: CPT

## 2022-09-13 PROCEDURE — 80048 BASIC METABOLIC PNL TOTAL CA: CPT

## 2022-09-13 PROCEDURE — 93662 INTRACARDIAC ECG (ICE): CPT

## 2022-09-13 PROCEDURE — C1759: CPT

## 2022-09-13 PROCEDURE — C1732: CPT

## 2022-09-13 PROCEDURE — 93010 ELECTROCARDIOGRAM REPORT: CPT

## 2022-09-13 PROCEDURE — 36415 COLL VENOUS BLD VENIPUNCTURE: CPT

## 2022-09-13 PROCEDURE — 93462 L HRT CATH TRNSPTL PUNCTURE: CPT

## 2022-09-13 PROCEDURE — C1766: CPT

## 2022-09-13 PROCEDURE — 93005 ELECTROCARDIOGRAM TRACING: CPT

## 2022-09-13 PROCEDURE — 93613 INTRACARDIAC EPHYS 3D MAPG: CPT

## 2022-09-13 PROCEDURE — 93662 INTRACARDIAC ECG (ICE): CPT | Mod: 26

## 2022-09-13 PROCEDURE — C1889: CPT

## 2022-09-13 RX ORDER — ATORVASTATIN CALCIUM 80 MG/1
1 TABLET, FILM COATED ORAL
Qty: 0 | Refills: 0 | DISCHARGE

## 2022-09-13 RX ORDER — LEVOTHYROXINE SODIUM 125 MCG
75 TABLET ORAL DAILY
Refills: 0 | Status: DISCONTINUED | OUTPATIENT
Start: 2022-09-13 | End: 2022-09-27

## 2022-09-13 RX ORDER — ASPIRIN/CALCIUM CARB/MAGNESIUM 324 MG
1 TABLET ORAL
Qty: 30 | Refills: 0
Start: 2022-09-13 | End: 2022-10-12

## 2022-09-13 RX ORDER — ACETAMINOPHEN 500 MG
650 TABLET ORAL EVERY 6 HOURS
Refills: 0 | Status: DISCONTINUED | OUTPATIENT
Start: 2022-09-13 | End: 2022-09-27

## 2022-09-13 RX ORDER — METOPROLOL TARTRATE 50 MG
25 TABLET ORAL DAILY
Refills: 0 | Status: DISCONTINUED | OUTPATIENT
Start: 2022-09-13 | End: 2022-09-27

## 2022-09-13 RX ORDER — CALCITRIOL 0.5 UG/1
0.25 CAPSULE ORAL DAILY
Refills: 0 | Status: DISCONTINUED | OUTPATIENT
Start: 2022-09-13 | End: 2022-09-27

## 2022-09-13 RX ORDER — ALPRAZOLAM 0.25 MG
0.25 TABLET ORAL EVERY 8 HOURS
Refills: 0 | Status: DISCONTINUED | OUTPATIENT
Start: 2022-09-13 | End: 2022-09-13

## 2022-09-13 RX ORDER — LOSARTAN POTASSIUM 100 MG/1
100 TABLET, FILM COATED ORAL DAILY
Refills: 0 | Status: DISCONTINUED | OUTPATIENT
Start: 2022-09-13 | End: 2022-09-27

## 2022-09-13 RX ORDER — LOSARTAN POTASSIUM 100 MG/1
1 TABLET, FILM COATED ORAL
Qty: 0 | Refills: 0 | DISCHARGE

## 2022-09-13 RX ORDER — OXYCODONE HYDROCHLORIDE 5 MG/1
5 TABLET ORAL EVERY 6 HOURS
Refills: 0 | Status: DISCONTINUED | OUTPATIENT
Start: 2022-09-13 | End: 2022-09-13

## 2022-09-13 RX ORDER — CALCITRIOL 0.5 UG/1
1 CAPSULE ORAL
Qty: 0 | Refills: 0 | DISCHARGE

## 2022-09-13 RX ORDER — ASPIRIN/CALCIUM CARB/MAGNESIUM 324 MG
81 TABLET ORAL DAILY
Refills: 0 | Status: DISCONTINUED | OUTPATIENT
Start: 2022-09-13 | End: 2022-09-27

## 2022-09-13 RX ORDER — ONDANSETRON 8 MG/1
4 TABLET, FILM COATED ORAL EVERY 6 HOURS
Refills: 0 | Status: DISCONTINUED | OUTPATIENT
Start: 2022-09-13 | End: 2022-09-27

## 2022-09-13 RX ORDER — ATORVASTATIN CALCIUM 80 MG/1
20 TABLET, FILM COATED ORAL AT BEDTIME
Refills: 0 | Status: DISCONTINUED | OUTPATIENT
Start: 2022-09-13 | End: 2022-09-27

## 2022-09-13 RX ORDER — LEVOTHYROXINE SODIUM 125 MCG
1 TABLET ORAL
Qty: 0 | Refills: 0 | DISCHARGE

## 2022-09-13 RX ORDER — TADALAFIL 10 MG/1
1 TABLET, FILM COATED ORAL
Qty: 0 | Refills: 0 | DISCHARGE

## 2022-09-13 RX ADMIN — Medication 81 MILLIGRAM(S): at 15:53

## 2022-09-13 NOTE — DISCHARGE NOTE PROVIDER - CARE PROVIDER_API CALL
Noe Garcia)  Cardiac Electrophysiology; Cardiovascular Disease; Internal Medicine  [address] [place] [address]  Phone: [phone]  Fax: [phone]  Follow Up Time:     Lamonte Reyes)  Cardiovascular Disease; Internal Medicine  [address], [facility] [address]  [place] [address]  Phone: [phone]  Fax: [phone]  Follow Up Time:

## 2022-09-13 NOTE — DISCHARGE NOTE PROVIDER - NSDCCPCAREPLAN_GEN_ALL_CORE_FT
PRINCIPAL DISCHARGE DIAGNOSIS  Diagnosis: Premature ventricular complex  Assessment and Plan of Treatment:

## 2022-09-13 NOTE — DISCHARGE NOTE PROVIDER - NSDCFUADDINST_GEN_ALL_CORE_FT
Follow up with Dr. Garcia in 2 - 4 weeks. Our office will contact you in 3-5 days to schedule this appointment. Please call [phone] with questions or concerns.

## 2022-09-13 NOTE — PROGRESS NOTE ADULT - SUBJECTIVE AND OBJECTIVE BOX
Pt doing well s/p PVC / VT ablation. Ambulating without difficulty, tolerating PO intake. Requesting to go home.     Groins: bandages/hemostatic sutures removed; sites C/D/I b/l; no bleeding or hematoma    Plan:   Pt has remained stable throughout recovery without event and is in favor of same day discharge.    Patient Escort identified and reliable, working contact phone numbers are confirmed.  Will start Aspirin 81mg QD x 30 days  Pt advised to call immediately with questions or concerns, seek immediate medical attention for acute distress.    Importance of this follow up was reviewed with patient and/or caregiver.

## 2022-09-13 NOTE — PROGRESS NOTE ADULT - SUBJECTIVE AND OBJECTIVE BOX
Pt is a 78 y/o male h/o of HTN, HLD, hypothyroidism, PVCs and NSVT, presents electively today for a PVC / VT ablation with Dr. Garcia. Patient initially complained of palpitations in April/May, he saw his cardiologist Dr. Reyes  on 6/2022 and was noted to be in sinus bradycardia with 1st degree AVB.  Patient underwent a 24 hours Holter monitor, which demonstrated an episode of 21 beats WCT. Pt had a NST performed which showed normal perfusion and normal EF, as well as a cardiac MRI which showed normal LV/RV and EF and no scar on LGE. Pt reports no acute complaints at time of arrival today. Pt states last PO intake was yesterday afternoon (besides meds with sips of water) and COVID-19 PCR negative 9/8. Pt currently denies chest pain, SOB, dizziness, fever, chills, nausea, vomiting, syncopal episodes. PST labs reviewed, will maintain NPO status pending procedure.     Cardiology Summary:     EKG 8/22/2022. SB @ 59 bpm and 1st degree AVB  EKG 7/25/2022: SR with PVCs. PVC morphology is RBBB-like with superior axis. Normal QTc  Cardiac MRI: Normal RV/LV, and LVEF with no LGE.   Echo (@[facility]) 7.6.22: LVEF 50-55%, grade I diastolic dysfunction, mildly dilated RA, normal LA, trivial TV regurg, moderate MV regurg, moderate AV regurg, mod PV regurg  Holter 7/5 - 7/6/22 sinus rhythm at 44 to 93 bpm (mean 56 bpm) with borderline 1st degree AV block. Frequent PVCs; 939 pairs, 93 runs - longest 21 beats @ max 183 bpm. Rare APCs.  NST [facility] 7/7/2022.  IMPRESSIONS:  * Myocardial Perfusion SPECT results are normal.  * Normal myocardial perfusion scan, with no evidence of infarction or inducible ischemia.  * Post-stress resting myocardial perfusion gated SPECT imaging was performed (LVEF = 67 %;LVEDV = 92 ml.), normal global LV systolic function.  * Frequent PVCs, ventricular bigeminy/trigeminy and ventricular couplets during rest/stress.

## 2022-09-13 NOTE — DISCHARGE NOTE NURSING/CASE MANAGEMENT/SOCIAL WORK - PATIENT PORTAL LINK FT
You can access the FollowMyHealth Patient Portal offered by [facility] by registering at the following website: [email or web address]. By joining [facility]’s FollowMyHealth portal, you will also be able to view your health information using other applications (apps) compatible with our system.

## 2022-09-13 NOTE — DISCHARGE NOTE PROVIDER - HOSPITAL COURSE
78 y/o male h/o of HTN, HLD, hypothyroidism, with complaints of palpitations found to have an episode of WCT on outpatient Holter monitor, presented electively 9/13/22 for and is now s/p PVC / VT ablation (L sided basal / inferoseptal)  access achieved via b/l femoral veins and RFA. Hemostasis of b/l FV achieved with figure of 8 sutures and direct pressure to RFA with Dr. Garcia. 76 y/o male h/o of HTN, HLD, hypothyroidism, with complaints of palpitations found to have an episode of WCT on outpatient Holter monitor, presented electively 9/13/22 for and is now s/p PVC / VT ablation (L sided basal / inferoseptal)  access achieved via b/l femoral veins and RFA. Hemostasis of b/l FV achieved with figure of 8 sutures and direct pressure to RFA with Dr. Garcia. The patient was observed per post procedure protocol, then discharged home with a plan for outpatient follow up.

## 2022-09-13 NOTE — PROGRESS NOTE ADULT - SUBJECTIVE AND OBJECTIVE BOX
PROCEDURE(S): Radiofrequency Ablation of PVCs / VT    ELECTROPHYSIOLOGIST(S): Noe Garcia MD         COMPLICATIONS:  none        DISPOSITION: observation      CONDITION: stable      Pt doing well s/p elective radiofrequency PVC / VT ablation (basal / inferoseptal Access achieved via b/l femoral veins and RFA. Hemostasis of b/l FV achieved with figure of 8 sutures and direct pressure to RFA.     MEDICATIONS  (STANDING):  aspirin enteric coated 81 milliGRAM(s) Oral daily  atorvastatin 20 milliGRAM(s) Oral at bedtime  calcitriol   Capsule 0.25 MICROGram(s) Oral daily  levothyroxine 75 MICROGram(s) Oral daily  losartan 100 milliGRAM(s) Oral daily  metoprolol succinate ER 25 milliGRAM(s) Oral daily    MEDICATIONS  (PRN):  acetaminophen     Tablet .. 650 milliGRAM(s) Oral every 6 hours PRN Moderate Pain (4 - 6)  ALPRAZolam 0.25 milliGRAM(s) Oral every 8 hours PRN Anxiety / Insomnia  aluminum hydroxide/magnesium hydroxide/simethicone Suspension 30 milliLiter(s) Oral every 4 hours PRN Dyspepsia  ondansetron Injectable 4 milliGRAM(s) IV Push every 6 hours PRN Nausea and/or Vomiting  oxyCODONE    IR 5 milliGRAM(s) Oral every 6 hours PRN Severe Pain (7 - 10)      Allergies    No Known Allergies    Intolerances          VS:  T(C): 36.4 (09-13-22 @ 07:45), Max: 36.4 (09-13-22 @ 07:45)  HR: 49 (09-13-22 @ 07:45) (49 - 49)  BP: 173/79 (09-13-22 @ 07:45) (173/79 - 173/79)  RR: 16 (09-13-22 @ 07:45) (16 - 16)  SpO2: 100% (09-13-22 @ 07:45) (100% - 100%)  Wt(kg): --        Post procedure VS:  HR:  BP:  RR:  SpO2:      Exam:  General: NAD, A&O x 3  Card: S1/S2, RRR, no m/g/r  Resp: lungs CTA b/l  Abd: S/NT/ND  Groins: hemostatic sutures in place; sites C/D/I; no bleeding, hematoma, erythema, exudate or edema  Ext: no edema; distal pulses intact    I/O's    Input:       ECG: Pending    Assessment:   77y Male h/o ***.  Now status post uncomplicated radiofrequency ablation of PVC / VT.      Plan:   Bedrest x 4 hours, then OOB with assistance and progress as tolerated.   Groin sutures to be removed by EP service prior to discharge  Start Aspirin 81mg QD x 30 days  Continue other home medications.   Strict I/Os.  Please encourage incentive spirometry and ambulation once able.     PROCEDURE(S): Radiofrequency Ablation of PVCs / VT    ELECTROPHYSIOLOGIST(S): Noe Garcia MD         COMPLICATIONS:  none        DISPOSITION: observation      CONDITION: stable      Pt doing well s/p elective radiofrequency PVC / VT ablation (basal / inferoseptal) Access achieved via b/l femoral veins and RFA. Hemostasis of b/l FV achieved with figure of 8 sutures and direct pressure to RFA.     MEDICATIONS  (STANDING):  aspirin enteric coated 81 milliGRAM(s) Oral daily  atorvastatin 20 milliGRAM(s) Oral at bedtime  calcitriol   Capsule 0.25 MICROGram(s) Oral daily  levothyroxine 75 MICROGram(s) Oral daily  losartan 100 milliGRAM(s) Oral daily  metoprolol succinate ER 25 milliGRAM(s) Oral daily    MEDICATIONS  (PRN):  acetaminophen     Tablet .. 650 milliGRAM(s) Oral every 6 hours PRN Moderate Pain (4 - 6)  ALPRAZolam 0.25 milliGRAM(s) Oral every 8 hours PRN Anxiety / Insomnia  aluminum hydroxide/magnesium hydroxide/simethicone Suspension 30 milliLiter(s) Oral every 4 hours PRN Dyspepsia  ondansetron Injectable 4 milliGRAM(s) IV Push every 6 hours PRN Nausea and/or Vomiting  oxyCODONE    IR 5 milliGRAM(s) Oral every 6 hours PRN Severe Pain (7 - 10)      Allergies    No Known Allergies    Intolerances          VS:  T(C): 36.4 (09-13-22 @ 07:45), Max: 36.4 (09-13-22 @ 07:45)  HR: 49 (09-13-22 @ 07:45) (49 - 49)  BP: 173/79 (09-13-22 @ 07:45) (173/79 - 173/79)  RR: 16 (09-13-22 @ 07:45) (16 - 16)  SpO2: 100% (09-13-22 @ 07:45) (100% - 100%)        Post procedure VS:  HR: 55  BP: 141/69  RR: 18  SpO2: 100%      Exam:  General: NAD, A&O x 3  Card: S1/S2, RRR, no m/g/r  Resp: lungs CTA b/l  Abd: S/NT/ND  Groins: hemostatic sutures in place; sites C/D/I; no bleeding, hematoma, erythema, exudate or edema  Ext: no edema; distal pulses intact    I/O's    Input: 1207      ECG: Sinus bradycardia, intact conduction. . .    Assessment:   78 y/o male h/o of HTN, HLD, hypothyroidism, with complaints of palpitations found to have an episode of WCT on outpatient Holter monitor, presented electively today 9/13/22 for and is now s/p PVC / VT ablation (L sided basal / inferoseptal)  access achieved via b/l femoral veins and RFA. Hemostasis of b/l FV achieved with figure of 8 sutures and direct pressure to RFA with Dr. Garcia. Pt reports no complaints post procedure      Plan:   Bedrest x 4 hours, then OOB with assistance and progress as tolerated.   Groin sutures to be removed by EP service prior to discharge  Start Aspirin 81mg QD x 30 days  Continue other home medications.   Strict I/Os.  Please encourage incentive spirometry and ambulation once able.

## 2022-09-13 NOTE — DISCHARGE NOTE PROVIDER - NSDCMRMEDTOKEN_GEN_ALL_CORE_FT
atorvastatin 20 mg oral tablet: 1 tab(s) orally once a day  calcitriol 0.25 mcg oral capsule: 1 cap(s) orally once a day  Cialis 20 mg oral tablet: 1 tab(s) orally once a day, As Needed  levothyroxine 75 mcg (0.075 mg) oral capsule: 1 cap(s) orally once a day  losartan 100 mg oral tablet: 1 tab(s) orally once a day  Metoprolol Succinate ER 25 mg oral tablet, extended release: 1 tab(s) orally once a day    aspirin 81 mg oral delayed release tablet: 1 tab(s) orally once a day x 30 days   atorvastatin 20 mg oral tablet: 1 tab(s) orally once a day  calcitriol 0.25 mcg oral capsule: 1 cap(s) orally once a day  Cialis 20 mg oral tablet: 1 tab(s) orally once a day, As Needed  levothyroxine 75 mcg (0.075 mg) oral capsule: 1 cap(s) orally once a day  losartan 100 mg oral tablet: 1 tab(s) orally once a day  Metoprolol Succinate ER 25 mg oral tablet, extended release: 1 tab(s) orally once a day

## 2022-09-13 NOTE — DISCHARGE NOTE NURSING/CASE MANAGEMENT/SOCIAL WORK - NSDCPEFALRISK_GEN_ALL_CORE
For information on Fall & Injury Prevention, visit: [email or web address] OR  [email or web address] OR  https://www.cdc.gov/steadi/patient.html

## 2022-09-13 NOTE — DISCHARGE NOTE PROVIDER - NSDCCPTREATMENT_GEN_ALL_CORE_FT
PRINCIPAL PROCEDURE  Procedure: Radiofreqency ablation (RFA) of arrhythmogenic focus for ventricular tachycardia  Findings and Treatment: - Bruising at the groin, sometimes extending down the leg, and/or a small lump under the skin at the groin access site is normal and will resolve within 2 – 3 weeks.   - Occasional skipped beats or palpitations that last for a few beats are common and generally resolve within 1-2 months.   - You may walk and take stairs at a regular pace.   - Do not perform any exercise more strenuous than walking for 1 week.   - Do not strain or lift heavy objects for 1 week.  - You may shower the day after the procedure.  - Do not soak in water (such as tub baths, hot tubs, swimming, etc.) for 1 week.   - You may resume all other activities the day after the procedure.  Call your doctor if:   - you notice bleeding, redness, drainage, swelling, increased tenderness or a hot sensation around the catheter insertion site.   - your temperature is greater than 100 degrees F for more than 24 hours.  - your rapid heart rhythm returns.  - you have any questions or concerns regarding the procedure.  If significant bleeding and/or a large lump (the size of a golf ball or bigger) occurs:  - Lie flat and apply continuous direct pressure just above the puncture site for at least 10 minutes  - If the issue resolves, notify your physician immediately.    - If the bleeding cannot be controlled, please seek immediate medical attention.  If you experience increased difficulty breathing or chest pain, or if you faint or have dizzy spells, please seek immediate medical attention.

## 2022-09-15 PROBLEM — I47.2 VENTRICULAR TACHYCARDIA: Chronic | Status: ACTIVE | Noted: 2022-09-09

## 2022-09-15 PROBLEM — I49.3 VENTRICULAR PREMATURE DEPOLARIZATION: Chronic | Status: ACTIVE | Noted: 2022-09-09

## 2022-10-10 ENCOUNTER — APPOINTMENT (OUTPATIENT)
Dept: ELECTROPHYSIOLOGY | Facility: CLINIC | Age: 77
End: 2022-10-10

## 2022-10-10 VITALS
DIASTOLIC BLOOD PRESSURE: 78 MMHG | HEIGHT: 68 IN | BODY MASS INDEX: 26.22 KG/M2 | TEMPERATURE: 97.6 F | WEIGHT: 173 LBS | SYSTOLIC BLOOD PRESSURE: 142 MMHG | HEART RATE: 35 BPM | OXYGEN SATURATION: 97 %

## 2022-10-10 PROCEDURE — 93000 ELECTROCARDIOGRAM COMPLETE: CPT

## 2022-10-10 PROCEDURE — 99214 OFFICE O/P EST MOD 30 MIN: CPT | Mod: 25

## 2022-10-10 NOTE — DISCUSSION/SUMMARY
[EKG obtained to assist in diagnosis and management of assessed problem(s)] : EKG obtained to assist in diagnosis and management of assessed problem(s) [FreeTextEntry1] : 1. PVCs and NSVTs. He has preserved EF, normal perfusion, unremarkable cardiac MRI and no malignant personal or family history. He is feeling a bit better since PVC ablation. Used aspirin for 4 weeks as instructed. .\par - One week MCOT to r/a response to PVC ablation and PVC burden. One of his PVC morphologies was very rare during the case and was not targeted by mapping or ablation. \par - c/w Toprol 25 mg daily.\par \par 2. SB, asymptomatic. No current indication for PPM. Reasonable HR trend on last monitor. Ramana fatigue. Will consider stopping metoprolol after the MCOT. \par \par Follow up in 6 weeks or sooner if needed. Continue cardiac care with Dr. Reyes at [facility].

## 2022-10-10 NOTE — ASSESSMENT
[FreeTextEntry1] : This is a 78 y/o male h/o of HTN, HLD, hypothyroidism and palpitation, described as his heart pounding when laying down, for several weeks. He saw Dr. Reyes @ [facility] on 6/2022 and was noted to be in sinus bradycardia with 1st degree AVB. At that time, he was on metoprolol plus diltiazem. His metoprolol was stopped and he continued on diltiazem 240 mg daily and underwent a 24 hours Holter monitor on diltiazem and one week after stopping metoprolol. Also underwent an echo there which showed preserved EF. The Holter monitor demonstrated an episode of 21 beats WCT and he was advised to come to [facility] ED for evaluation. On presentation he was found to be in sinus rhythm at 50s bpm with borderline HI (200-220bpm) with frequent PVCs. Tele since admission demonstrates sinus rhythm 50s - 60s with intact conduction and frequent PVCs. \par \par EP is consulted for sinus bradycardia, PVCs and NSVT. He underwent a NST while in hospital which showed normal perfusion and normal EF. TSH was normal while in hospital. We started him on metoprolol 25 mg daily and arranged for one week MCOT and outpatient follow up. He is here for follow up. He continues to have palpitation but they are less severe compared to prior last admission. \par \par He wore the monitor and sent it back 2 days ago. \par \par HPI 8/22/2022. Above from last visit on 7/25/2022. Since then, he had a cardiac MRI which showed normal LV/RV and EF and no scar on LGE. His event monitor (on current meds) reported 7% PVCs. He has been compliant with medications and continues to feel very bothersome palpitation. No CP, SOB, syncope, presyncope, dizziness, or bleeding.\par \par HPI 10/10/2022.\par Above from last visit on 8/22/2022. Since then he underwent a PVC ablation with me on 9/13/2022 (RFA to PVC focus from basal infero-septum LV). Four PVCs seen (see EP report). We used LFA, LFV and RFV access. We started him on aspirin for 4 weeks after. He reports feeling much better with significant improvement in his palpitation burden. Denies any pain, swelling or bleeding from the groins.

## 2022-10-10 NOTE — HISTORY OF PRESENT ILLNESS
[FreeTextEntry1] : This is a 76 y/o male h/o of HTN, HLD, hypothyroidism and palpitation, described as his heart pounding when laying down, for several weeks. He saw Dr. Reyes @ [facility] on 6/2022 and was noted to be in sinus bradycardia with 1st degree AVB. At that time, he was on metoprolol plus diltiazem. His metoprolol was stopped and he continued on diltiazem 240 mg daily and underwent a 24 hours Holter monitor on diltiazem and one week after stopping metoprolol. Also underwent an echo there which showed preserved EF. The Holter monitor demonstrated an episode of 21 beats WCT and he was advised to come to [facility] ED for evaluation. On presentation he was found to be in sinus rhythm at 50s bpm with borderline [place] (200-220bpm) with frequent PVCs. Tele since admission demonstrates sinus rhythm 50s - 60s with intact conduction and frequent PVCs. \par \par EP is consulted for sinus bradycardia, PVCs and NSVT. He underwent a NST while in hospital which showed normal perfusion and normal EF. TSH was normal while in hospital. We started him on metoprolol 25 mg daily and arranged for one week MCOT and outpatient follow up. He is here for follow up. He continues to have palpitation but they are less severe compared to prior last admission. He denies any fevers, chills, cough, sweats, chest pain, dyspnea on exertion, orthopnea, paroxysmal nocturnal dyspnea, peripheral edema, lightheadedness, dizziness, syncope, nausea, vomiting, melena, hematochezia, or hematemesis.\par \par He wore the monitor and sent it back 2 days ago. \par \par HPI 8/22/2022. Above from last visit on 7/25/2022. Since then, he had a cardiac MRI which showed normal LV/RV and EF and no scar on LGE. His event monitor (on current meds) reported 7% PVCs. He has been compliant with medications and continues to feel very bothersome palpitation. No CP, SOB, syncope, presyncope, dizziness, or bleeding.\par \par HPI 10/10/2022.\par Above from last visit on 8/22/2022. Since then he underwent a PVC ablation with me on 9/13/2022 (RFA to PVC focus from basal infero-septum LV). Four PVCs seen (see EP report). We used LFA, LFV and RFV access. We started him on aspirin for 4 weeks after. He reports feeling much better with significant improvement in his palpitation burden. Denies any pain, swelling or bleeding from the groins. He denies any fevers, chills, cough, sweats, chest pain,  dyspnea on exertion, orthopnea, paroxysmal nocturnal dyspnea, peripheral edema, lightheadedness, dizziness, syncope, nausea, vomiting, melena, hematochezia, or hematemesis.\par \par \par Social history:\par Negative for smoking, illicit drugs or alcohol abuse.\par \par Family Hx:\par He stated that his mother dies of a heart attack at age 62 but she also had enlarged heart. No other FH of SCD\par \par \par TESTS:\par EKG 10/10/2022: SB at 47 bpm, otherwise normal. \par EKG  8/22/2022.  SB @ 59 bpm and 1st degree AVB\par EKG  7/25/2022: SR with PVCs. PVC morphology is RBBB-like with superior axis. Normal QTc\par \par Cardiac MRI: Normal RV/LV, and LVEF with no LGE. \par \par Echo (@[facility]) 7.6.22: LVEF 50-55%, grade I diastolic dysfunction, mildly dilated RA, normal LA, trivial TV regurg, moderate MV regurg, moderate AV regurg, mod PV regurg\par \par Holter 7/5 - 7/6/22 sinus rhythm at 44 to 93 bpm (mean 56 bpm) with borderline 1st degree AV block. Frequent PVCs; 939 pairs, 93 runs - longest 21 beats @ max 183 bpm. Rare APCs.\par \par NST [facility] 7/7/2022.\par IMPRESSIONS:\par * Myocardial Perfusion SPECT results are normal.\par * Normal myocardial perfusion scan, with no evidence of infarction or inducible ischemia.\par * Post-stress resting myocardial perfusion gated SPECT imaging was performed (LVEF = 67 %;LVEDV = 92 ml.), normal global LV systolic function.\par * Frequent PVCs, ventricular bigeminy/trigeminy and ventricular couplets during rest/stress.

## 2022-10-10 NOTE — REASON FOR VISIT
[Arrhythmia/ECG Abnorrmalities] : arrhythmia/ECG abnormalities [FreeTextEntry3] : Dr. Lamonte Reyes @ [facility]

## 2022-10-10 NOTE — PHYSICAL EXAM
[Well Developed] : well developed [Well Nourished] : well nourished [No Acute Distress] : no acute distress [Normal Venous Pressure] : normal venous pressure [Normal S1, S2] : normal S1, S2 [No Murmur] : no murmur [No Gallop] : no gallop [Clear Lung Fields] : clear lung fields [Good Air Entry] : good air entry [No Respiratory Distress] : no respiratory distress  [Soft] : abdomen soft [Non Tender] : non-tender [Normal Gait] : normal gait [No Edema] : no edema [No Rash] : no rash [Moves all extremities] : moves all extremities [No Focal Deficits] : no focal deficits [Normal Speech] : normal speech [Alert and Oriented] : alert and oriented [Normal memory] : normal memory [de-identified] : Both groins are normal. No swelling or bruit.

## 2022-11-18 NOTE — ED ADULT TRIAGE NOTE - GLASGOW COMA SCALE: SCORE, MLM
[de-identified] : Katina is a 51 year old female here for follow up visit, s/p robot-assisted laparoscopic sleeve gastrectomy and hiatal hernia repair 5/3/22. S/p Laparoscopic repair of recurrent hiatal hernia and upper endoscopy 6/3/22. \par She saw Dr. 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 Sanchez reports continued reflux and heartburn.  She continues to take PPIs.  She does note that with solid foods, she continues to feel as though food gets stuck in her esophagus, without reports of regurgitation.\par She notes that while she is happy with her weight loss, which has plateaued over the last month, she does not want to continue taking PPIs, and is concerned by the continuing reflux and dysphagia symptoms.\par \par  15

## 2022-11-21 ENCOUNTER — APPOINTMENT (OUTPATIENT)
Dept: ELECTROPHYSIOLOGY | Facility: CLINIC | Age: 77
End: 2022-11-21

## 2022-11-21 VITALS
DIASTOLIC BLOOD PRESSURE: 92 MMHG | WEIGHT: 175 LBS | HEIGHT: 68 IN | BODY MASS INDEX: 26.52 KG/M2 | TEMPERATURE: 97.6 F | HEART RATE: 39 BPM | SYSTOLIC BLOOD PRESSURE: 166 MMHG | OXYGEN SATURATION: 94 %

## 2022-11-21 DIAGNOSIS — R00.2 PALPITATIONS: ICD-10-CM

## 2022-11-21 PROCEDURE — 99215 OFFICE O/P EST HI 40 MIN: CPT | Mod: 25

## 2022-11-21 PROCEDURE — 93000 ELECTROCARDIOGRAM COMPLETE: CPT

## 2022-11-23 ENCOUNTER — NON-APPOINTMENT (OUTPATIENT)
Age: 77
End: 2022-11-23

## 2022-11-23 PROBLEM — R00.2 HEART PALPITATIONS: Status: ACTIVE | Noted: 2022-07-08

## 2022-11-23 NOTE — REASON FOR VISIT
[Arrhythmia/ECG Abnorrmalities] : arrhythmia/ECG abnormalities [FreeTextEntry3] : Dr. Lamonte Reyes @ [facility]

## 2022-11-23 NOTE — HISTORY OF PRESENT ILLNESS
[FreeTextEntry1] : This is a 78 y/o male h/o of HTN, HLD, hypothyroidism and palpitation, described as his heart pounding when laying down, for several weeks. He saw Dr. Reyes @ [facility] on 6/2022 and was noted to be in sinus bradycardia with 1st degree AVB. At that time, he was on metoprolol plus diltiazem. His metoprolol was stopped and he continued on diltiazem 240 mg daily and underwent a 24 hours Holter monitor on diltiazem and one week after stopping metoprolol. Also underwent an echo there which showed preserved EF. The Holter monitor demonstrated an episode of 21 beats WCT and he was advised to come to [facility] ED for evaluation. On presentation he was found to be in sinus rhythm at 50s bpm with borderline AR (200-220bpm) with frequent PVCs. Tele since admission demonstrates sinus rhythm 50s - 60s with intact conduction and frequent PVCs. \par \par EP is consulted for sinus bradycardia, PVCs and NSVT. He underwent a NST while in hospital which showed normal perfusion and normal EF. TSH was normal while in hospital. We started him on metoprolol 25 mg daily and arranged for one week MCOT and outpatient follow up. He is here for follow up. He continues to have palpitation but they are less severe compared to prior last admission. He denies any fevers, chills, cough, sweats, chest pain, dyspnea on exertion, orthopnea, paroxysmal nocturnal dyspnea, peripheral edema, lightheadedness, dizziness, syncope, nausea, vomiting, melena, hematochezia, or hematemesis.\par \par He wore the monitor and sent it back 2 days ago. \par \par HPI 8/22/2022. Above from last visit on 7/25/2022. Since then, he had a cardiac MRI which showed normal LV/RV and EF and no scar on LGE. His event monitor (on current meds) reported 7% PVCs. He has been compliant with medications and continues to feel very bothersome palpitation. No CP, SOB, syncope, presyncope, dizziness, or bleeding.\par \par HPI 10/10/2022.\par Above from last visit on 8/22/2022. Since then he underwent a PVC ablation with me on 9/13/2022 (RFA to PVC focus from basal infero-septum LV). Four PVCs seen (see EP report). We used LFA, LFV and RFV access. We started him on aspirin for 4 weeks after. He reports feeling much better with significant improvement in his palpitation burden. Denies any pain, swelling or bleeding from the groins. He denies any fevers, chills, cough, sweats, chest pain,  dyspnea on exertion, orthopnea, paroxysmal nocturnal dyspnea, peripheral edema, lightheadedness, dizziness, syncope, nausea, vomiting, melena, hematochezia, or hematemesis.\par \par HPI 11/21/2022\par Since last visit patient reports feeling much better with very rare and mildly symptomatic brief (1-2 seconds) palpitations which is an improvement compared to pre PVC ablation. He had an MCOT which showed frequent PVCs (7%) and PACs (5%). PVC burden was possibly over-estimated due to aberrancy with PACs. His EKG today showed SR with inferiorly directed PVCs which is very different than the dominant one prior to ablation which was targeted by ablation. We discussed that, we did notice this PVC (the inferior axis one) during the case but it was very rare and so could not be targeted by ablation at that time (see EP report for details). \par \par Social history:\par Negative for smoking, illicit drugs or alcohol abuse.\par \par Family Hx:\par He stated that his mother dies of a heart attack at age 62 but she also had enlarged heart. No other FH of SCD\par \par \par TESTS:\par EKG 11/21/2022: Sinus rhythm with PVCs. PVC morphology is RBBB-like with positive concordance in the precordial leads and left inferior axis. \par EKG 10/10/2022: SB at 47 bpm, otherwise normal. \par EKG  8/22/2022.  SB @ 59 bpm and 1st degree AVB\par EKG  7/25/2022: SR with PVCs. PVC morphology is RBBB-like with superior axis. Normal QTc\par \par Cardiac MRI: Normal RV/LV, and LVEF with no LGE. \par \par Echo (@[facility]) 7.6.22: LVEF 50-55%, grade I diastolic dysfunction, mildly dilated RA, normal LA, trivial TV regurg, moderate MV regurg, moderate AV regurg, mod PV regurg\par \par One week [facility] 10/2022: SR, with PVC (7%) and PACs (5%). No symptoms. \par \par Holter 7/5 - 7/6/22 sinus rhythm at 44 to 93 bpm (mean 56 bpm) with borderline 1st degree AV block. Frequent PVCs; 939 pairs, 93 runs - longest 21 beats @ max 183 bpm. Rare APCs.\par \par NST [facility] 7/7/2022.\par IMPRESSIONS:\par * Myocardial Perfusion SPECT results are normal.\par * Normal myocardial perfusion scan, with no evidence of infarction or inducible ischemia.\par * Post-stress resting myocardial perfusion gated SPECT imaging was performed (LVEF = 67 %;LVEDV = 92 ml.), normal global LV systolic function.\par * Frequent PVCs, ventricular bigeminy/trigeminy and ventricular couplets during rest/stress.

## 2022-11-23 NOTE — PHYSICAL EXAM
[Well Developed] : well developed [Well Nourished] : well nourished [No Acute Distress] : no acute distress [Normal Venous Pressure] : normal venous pressure [Normal S1, S2] : normal S1, S2 [No Murmur] : no murmur [No Gallop] : no gallop [Clear Lung Fields] : clear lung fields [Good Air Entry] : good air entry [No Respiratory Distress] : no respiratory distress  [Normal Gait] : normal gait [No Edema] : no edema [Moves all extremities] : moves all extremities [No Focal Deficits] : no focal deficits [Normal Speech] : normal speech [Alert and Oriented] : alert and oriented [Normal memory] : normal memory

## 2022-11-23 NOTE — ASSESSMENT
[FreeTextEntry1] : Patient with frequent symptomatic PVCs. More than one PVC morphology seen prior to ablation. The dominant one at that time was the one with superior axis which was eliminated by PVC ablation. The inferior axis one was very rare and so was not mapped or ablated during the first procedure. He is now with frequent PVCs the inferior axis one. We went over this and I answered his questions. He had symptomatic improvement, and we discussed that we will consider PVC suppression probably with another PVC ablation targeting this PVC if he is to develop symptoms, PVC induced VF, or PVC burden >10% with LV dilatation or dysfunction. \par - Continue with current dose of metoprolol for now\par - One week MCOT ON 5/2023 then follow up in 6/2023. \par - Will likely plan periodic MCOT to monitor the VA burden and screen for AF given frequent PACs. \par \par \par SB, asymptomatic. No current indication for PPM. Reasonable HR trend on last monitor. Ramana fatigue. Will monitor with MCOT. \par \par Continue cardiac care with Dr. Reyes at [facility].

## 2023-05-23 ENCOUNTER — NON-APPOINTMENT (OUTPATIENT)
Age: 78
End: 2023-05-23

## 2023-05-23 ENCOUNTER — APPOINTMENT (OUTPATIENT)
Dept: RADIATION ONCOLOGY | Facility: CLINIC | Age: 78
End: 2023-05-23
Payer: MEDICARE

## 2023-05-23 VITALS
DIASTOLIC BLOOD PRESSURE: 83 MMHG | RESPIRATION RATE: 16 BRPM | HEART RATE: 47 BPM | BODY MASS INDEX: 26.91 KG/M2 | OXYGEN SATURATION: 99 % | SYSTOLIC BLOOD PRESSURE: 183 MMHG | WEIGHT: 177 LBS

## 2023-05-23 DIAGNOSIS — Z92.3 PERSONAL HISTORY OF IRRADIATION: ICD-10-CM

## 2023-05-23 DIAGNOSIS — Z85.46 PERSONAL HISTORY OF MALIGNANT NEOPLASM OF PROSTATE: ICD-10-CM

## 2023-05-23 PROCEDURE — 99212 OFFICE O/P EST SF 10 MIN: CPT

## 2023-05-24 NOTE — REVIEW OF SYSTEMS
[Nocturia] : nocturia [Negative] : Allergic/Immunologic [IPSS Score (0-40): ___] : IPSS score: [unfilled] [EPIC-CP Score (0-60): ___] : EPIC-CP score: [unfilled]

## 2023-05-24 NOTE — PHYSICAL EXAM
[Normal] : oriented to person, place and time, the affect was normal, the mood was normal and not anxious [FreeTextEntry1] : deferred [de-identified] : deferred

## 2023-05-24 NOTE — DISEASE MANAGEMENT
[MeasuredProstateVolume] : 23.13 cc [RadiationCompletedDate] : 12/31/2014 [EBRTDose] : 8100 cGy [EBRTFractions] : 45

## 2023-05-24 NOTE — VITALS
[Maximal Pain Intensity: 0/10] : 0/10 [Least Pain Intensity: 0/10] : 0/10 [90: Able to carry normal activity; minor signs or symptoms of disease.] : 90: Able to carry normal activity; minor signs or symptoms of disease.  [0 - No Distress] : Distress Level: 0 [ECOG Performance Status: 1 - Restricted in physically strenuous activity but ambulatory and able to carry out work of a light or sedentary nature] : Performance Status: 1 - Restricted in physically strenuous activity but ambulatory and able to carry out work of a light or sedentary nature, e.g., light house work, office work

## 2023-05-24 NOTE — ASSESSMENT
[No evidence of disease] : No evidence of disease [FreeTextEntry1] : minimal stable treatment related sequelae.

## 2023-05-24 NOTE — HISTORY OF PRESENT ILLNESS
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 \par p c/o occasional nocturia .\par He denies dysuria, urgency, hesitancy, dribbling, hematuria, bony pain, change in bowel, fatigue & weight loss\par \par PSA \par 1/13/17 0.28 \par 5/30/18 0.4 \par 1/29/2020 0.53\par 2/18/21 0.7\par 10/22/21 0.57\par \par \par \par \par IPSS=1    EPIC =2

## 2023-06-19 ENCOUNTER — APPOINTMENT (OUTPATIENT)
Dept: ELECTROPHYSIOLOGY | Facility: CLINIC | Age: 78
End: 2023-06-19
Payer: MEDICARE

## 2023-06-19 ENCOUNTER — NON-APPOINTMENT (OUTPATIENT)
Age: 78
End: 2023-06-19

## 2023-06-19 VITALS
HEIGHT: 68 IN | HEART RATE: 48 BPM | BODY MASS INDEX: 25.91 KG/M2 | SYSTOLIC BLOOD PRESSURE: 152 MMHG | OXYGEN SATURATION: 98 % | WEIGHT: 171 LBS | TEMPERATURE: 97.5 F | DIASTOLIC BLOOD PRESSURE: 74 MMHG

## 2023-06-19 PROCEDURE — 93000 ELECTROCARDIOGRAM COMPLETE: CPT

## 2023-06-19 PROCEDURE — 99214 OFFICE O/P EST MOD 30 MIN: CPT | Mod: 25

## 2023-06-19 NOTE — ASSESSMENT
[FreeTextEntry1] : Patient with frequent symptomatic PVCs. More than one PVC morphology seen prior to ablation. The dominant one at that time was the one with superior axis which was eliminated by PVC ablation. The inferior axis one was very rare and so was not mapped or ablated during the first procedure. He is now with frequent PVCs the inferior axis one. We went over this and I answered his questions. He had symptomatic improvement, and we discussed that we will consider PVC suppression probably with another PVC ablation targeting this PVC if he is to develop symptoms, PVC induced VF, or PVC burden >10% with LV dilatation or dysfunction. \par - No PVC or PAC on most recent event monitor. \par - Continue with current dose of metoprolol for now\par - No need for routine EP follow up. Can consider periodic MCOT or HM with Dr. Reyes to monitor the VA burden and screen for AF given frequent PACs and come back to see EP if needed. \par \par \par SB, asymptomatic. No current indication for PPM. Reasonable HR trend on last monitor. Ramana fatigue. Will monitor with MCOT as above. Can consider stopping BB if worsening bradycardia. \par \par Continue cardiac care with Dr. Reyes at [facility] and come back to see EP as needed.

## 2023-06-19 NOTE — HISTORY OF PRESENT ILLNESS
[FreeTextEntry1] : This is a 79 y/o male h/o of HTN, HLD, hypothyroidism and palpitation, described as his heart pounding when laying down, for several weeks. He saw Dr. Reyes @ [facility] on 6/2022 and was noted to be in sinus bradycardia with 1st degree AVB. At that time, he was on metoprolol plus diltiazem. His metoprolol was stopped and he continued on diltiazem 240 mg daily and underwent a 24 hours Holter monitor on diltiazem and one week after stopping metoprolol. Also underwent an echo there which showed preserved EF. The Holter monitor demonstrated an episode of 21 beats WCT and he was advised to come to [facility] ED for evaluation. On presentation he was found to be in sinus rhythm at 50s bpm with borderline MI (200-220bpm) with frequent PVCs. Tele since admission demonstrates sinus rhythm 50s - 60s with intact conduction and frequent PVCs. \par \par EP is consulted for sinus bradycardia, PVCs and NSVT. He underwent a NST while in hospital which showed normal perfusion and normal EF. TSH was normal while in hospital. We started him on metoprolol 25 mg daily and arranged for one week MCOT and outpatient follow up. He is here for follow up. He continues to have palpitation but they are less severe compared to prior last admission. He denies any fevers, chills, cough, sweats, chest pain, dyspnea on exertion, orthopnea, paroxysmal nocturnal dyspnea, peripheral edema, lightheadedness, dizziness, syncope, nausea, vomiting, melena, hematochezia, or hematemesis.\par \par He wore the monitor and sent it back 2 days ago. \par \par HPI 8/22/2022. Above from last visit on 7/25/2022. Since then, he had a cardiac MRI which showed normal LV/RV and EF and no scar on LGE. His event monitor (on current meds) reported 7% PVCs. He has been compliant with medications and continues to feel very bothersome palpitation. No CP, SOB, syncope, presyncope, dizziness, or bleeding.\par \par HPI 10/10/2022.\par Above from last visit on 8/22/2022. Since then he underwent a PVC ablation with me on 9/13/2022 (RFA to PVC focus from basal infero-septum LV). Four PVCs seen (see EP report). We used LFA, LFV and RFV access. We started him on aspirin for 4 weeks after. He reports feeling much better with significant improvement in his palpitation burden. Denies any pain, swelling or bleeding from the groins. He denies any fevers, chills, cough, sweats, chest pain,  dyspnea on exertion, orthopnea, paroxysmal nocturnal dyspnea, peripheral edema, lightheadedness, dizziness, syncope, nausea, vomiting, melena, hematochezia, or hematemesis.\par \par HPI 11/21/2022\par Since last visit patient reports feeling much better with very rare and mildly symptomatic brief (1-2 seconds) palpitations which is an improvement compared to pre PVC ablation. He had an MCOT which showed frequent PVCs (7%) and PACs (5%). PVC burden was possibly over-estimated due to aberrancy with PACs. His EKG today showed SR with inferiorly directed PVCs which is very different than the dominant one prior to ablation which was targeted by ablation. We discussed that, we did notice this PVC (the inferior axis one) during the case but it was very rare and so could not be targeted by ablation at that time (see EP report for details). \par \par HPI 6/19/23:\par In last visit, we planned follow up with MCOT. He had his repeat MCOT which showed no PVCs at all. There were no PACs, symptoms or sustained arrhythmia and HR trend/histogram was reasonable. EKG today with SR without PVCs. Denies CP, SOB, palpitation, syncope or presyncope. Has good energy level. \par \par Social history:\par Negative for smoking, illicit drugs or alcohol abuse.\par \par Family Hx:\par He stated that his mother dies of a heart attack at age 62 but she also had enlarged heart. No other FH of SCD\par \par \par TESTS:\par EKG 6/19/23: SB @ 48 bpm\par EKG 11/21/2022: Sinus rhythm with PVCs. PVC morphology is RBBB-like with positive concordance in the precordial leads and left inferior axis. \par EKG 10/10/2022: SB at 47 bpm, otherwise normal. \par EKG  8/22/2022.  SB @ 59 bpm and 1st degree AVB\par EKG  7/25/2022: SR with PVCs. PVC morphology is RBBB-like with superior axis. Normal QTc\par \par Cardiac MRI: Normal RV/LV, and LVEF with no LGE. \par \par Echo (@[facility]) 7.6.22: LVEF 50-55%, grade I diastolic dysfunction, mildly dilated RA, normal LA, trivial TV regurg, moderate MV regurg, moderate AV regurg, mod PV regurg\par \par One week event monitor 6/2023: SR, average HR 53 (). No PAC/PVC or symptoms. \par One week MCOT 10/2022: SR, with PVC (7%) and PACs (5%). No symptoms. \par \par Holter 7/5 - 7/6/22 sinus rhythm at 44 to 93 bpm (mean 56 bpm) with borderline 1st degree AV block. Frequent PVCs; 939 pairs, 93 runs - longest 21 beats @ max 183 bpm. Rare APCs.\par \par NST [facility] 7/7/2022.\par IMPRESSIONS:\par * Myocardial Perfusion SPECT results are normal.\par * Normal myocardial perfusion scan, with no evidence of infarction or inducible ischemia.\par * Post-stress resting myocardial perfusion gated SPECT imaging was performed (LVEF = 67 %;LVEDV = 92 ml.), normal global LV systolic function.\par * Frequent PVCs, ventricular bigeminy/trigeminy and ventricular couplets during rest/stress.

## 2023-06-19 NOTE — REASON FOR VISIT
[Arrhythmia/ECG Abnorrmalities] : arrhythmia/ECG abnormalities [FreeTextEntry3] : Dr. Lamonte Reyes @ [facility]

## 2023-09-26 NOTE — H&P ADULT - NSHPSOCIALHISTORY_GEN_ALL_CORE
Spoke with parent about current rash on back of ears with open sore. Was previously scratching at it. Used hydrocortisone and had helped, but bad again.     Helped schedule next available appointment with Dr. Gaming. Encouraged mom to keep using hydrocortisone cream in the mean time.   No cigarette, drinks alcohol on weekend, no illicit drug use. Lives with family

## 2023-12-27 ENCOUNTER — APPOINTMENT (OUTPATIENT)
Dept: ELECTROPHYSIOLOGY | Facility: CLINIC | Age: 78
End: 2023-12-27
Payer: MEDICARE

## 2023-12-27 VITALS
BODY MASS INDEX: 26.52 KG/M2 | HEART RATE: 51 BPM | SYSTOLIC BLOOD PRESSURE: 110 MMHG | DIASTOLIC BLOOD PRESSURE: 70 MMHG | OXYGEN SATURATION: 98 % | WEIGHT: 175 LBS | HEIGHT: 68 IN

## 2023-12-27 DIAGNOSIS — I49.3 VENTRICULAR PREMATURE DEPOLARIZATION: ICD-10-CM

## 2023-12-27 PROCEDURE — 93000 ELECTROCARDIOGRAM COMPLETE: CPT

## 2023-12-27 PROCEDURE — 99214 OFFICE O/P EST MOD 30 MIN: CPT | Mod: 25

## 2024-03-04 ENCOUNTER — NON-APPOINTMENT (OUTPATIENT)
Age: 79
End: 2024-03-04

## 2024-03-04 PROBLEM — I49.3 FREQUENT PVCS: Status: ACTIVE | Noted: 2022-07-08

## 2024-03-04 NOTE — HISTORY OF PRESENT ILLNESS
[FreeTextEntry1] : The patient is a 78-year-old male presenting for follow up today. The patient has a history of frequent PVCs s/p ablation, asymptomatic sinus bradycardia, HTN, HLD, and hypothyroidism. He was noted to have frequent PVCs in 6/2022. Cardiac MRI showed normal function and no LV scar. He underwent a PVC ablation with Dr. Garcia in 9/2022 (targeted one out of four different PVCs) with some residual PVCs seen after the ablation procedure. He wore a monitor in 6/2023 which showed a very low burden. A recent monitor with Dr. Reyes showed a low burden (2%) and slow NSVT. He notes occasional palpitations. The patient denies any symptoms of shortness of breath, dizziness, chest pain, syncope or extremity edema.

## 2024-03-04 NOTE — DISCUSSION/SUMMARY
[EKG obtained to assist in diagnosis and management of assessed problem(s)] : EKG obtained to assist in diagnosis and management of assessed problem(s) [FreeTextEntry1] : The patient is a 78-year-old male with a history of asymptomatic sinus bradycardia and frequent PVCs s/p ablation of dominant PVC (9/2022 by Dr. Garcia). He now has a low PVC burden associated with infrequent symptoms. Periodic assessment with outpatient monitoring (every 6-12 months) should be performed. If symptoms are frequent in association with a PVC burden of >5% in the future re-ablation can be considered. Continue low dose metoprolol of 25 mg daily.

## 2024-07-31 ENCOUNTER — APPOINTMENT (OUTPATIENT)
Dept: RADIATION ONCOLOGY | Facility: CLINIC | Age: 79
End: 2024-07-31
Payer: MEDICARE

## 2024-07-31 VITALS
HEART RATE: 51 BPM | DIASTOLIC BLOOD PRESSURE: 90 MMHG | SYSTOLIC BLOOD PRESSURE: 150 MMHG | HEIGHT: 68 IN | OXYGEN SATURATION: 98 % | RESPIRATION RATE: 16 BRPM

## 2024-07-31 DIAGNOSIS — Z85.46 PERSONAL HISTORY OF MALIGNANT NEOPLASM OF PROSTATE: ICD-10-CM

## 2024-07-31 DIAGNOSIS — Z92.3 PERSONAL HISTORY OF IRRADIATION: ICD-10-CM

## 2024-07-31 PROCEDURE — G2211 COMPLEX E/M VISIT ADD ON: CPT

## 2024-07-31 PROCEDURE — 99213 OFFICE O/P EST LOW 20 MIN: CPT

## 2024-08-03 NOTE — PHYSICAL EXAM
[Normal] : oriented to person, place and time, the affect was normal, the mood was normal and not anxious [FreeTextEntry1] : deferred [de-identified] : deferred

## 2024-08-03 NOTE — HISTORY OF PRESENT ILLNESS
[FreeTextEntry1] : This 79 year old male returns today s/p radiation therapy (8,100cGy) for [id number] [place] 8 adenocarcinoma of the prostate completed 12/31/14.   He denies dysuria, urgency, hesitancy, dribbling, hematuria, bony pain, change in bowel, fatigue & weight loss.  PSA  trend (ng/mL): 1/13/17 = 0.28  5/30/18 = 0.4  1/29/20 = 0.53 2/18/21 = 0.7 10/22/21 = 0.57 2/16/23 = 0.31 7/31/23 = 0.21

## 2024-08-03 NOTE — ASSESSMENT
[No evidence of disease] : No evidence of disease [FreeTextEntry1] : No appreciable treatment related sequelae..

## 2024-08-03 NOTE — HISTORY OF PRESENT ILLNESS
[FreeTextEntry1] : This 79 year old male returns today s/p radiation therapy (8,100cGy) for [id number] [place] 8 adenocarcinoma of the prostate completed 12/31/14.   He denies dysuria, urgency, hesitancy, dribbling, hematuria, bony pain, change in bowel, fatigue & weight loss.  PSA  trend (ng/mL): 1/13/17 = 0.28  5/30/18 = 0.4  1/29/20 = 0.53 2/18/21 = 0.7 10/22/21 = 0.57 2/16/23 = 0.31 7/31/23 = 0.21

## 2024-08-03 NOTE — PHYSICAL EXAM
[Normal] : oriented to person, place and time, the affect was normal, the mood was normal and not anxious [FreeTextEntry1] : deferred [de-identified] : deferred

## 2024-08-03 NOTE — PHYSICAL EXAM
[Normal] : oriented to person, place and time, the affect was normal, the mood was normal and not anxious [FreeTextEntry1] : deferred [de-identified] : deferred

## 2024-08-03 NOTE — HISTORY OF PRESENT ILLNESS
[FreeTextEntry1] : This 79 year old male returns today s/p radiation therapy (8,100cGy) for [id number] [place] 8 adenocarcinoma of the prostate completed 12/31/14.   He denies dysuria, urgency, hesitancy, dribbling, hematuria, bony pain, change in bowel, fatigue & weight loss.  PSA  trend (ng/mL): 1/13/17 = 0.28  5/30/18 = 0.4  1/29/20 = 0.53 2/18/21 = 0.7 10/22/21 = 0.57 2/16/23 = 0.31 7/31/23 = 0.21

## 2024-08-21 ENCOUNTER — APPOINTMENT (OUTPATIENT)
Dept: ELECTROPHYSIOLOGY | Facility: CLINIC | Age: 79
End: 2024-08-21
Payer: MEDICARE

## 2024-08-21 ENCOUNTER — NON-APPOINTMENT (OUTPATIENT)
Age: 79
End: 2024-08-21

## 2024-08-21 VITALS
BODY MASS INDEX: 26.4 KG/M2 | WEIGHT: 174.2 LBS | DIASTOLIC BLOOD PRESSURE: 70 MMHG | HEIGHT: 68 IN | HEART RATE: 31 BPM | OXYGEN SATURATION: 99 % | SYSTOLIC BLOOD PRESSURE: 160 MMHG

## 2024-08-21 PROCEDURE — 93000 ELECTROCARDIOGRAM COMPLETE: CPT | Mod: 59

## 2024-08-21 PROCEDURE — 99215 OFFICE O/P EST HI 40 MIN: CPT | Mod: 25

## 2024-08-21 PROCEDURE — 93246 EXT ECG>7D<15D RECORDING: CPT

## 2024-08-21 NOTE — PHYSICAL EXAM
Detail Level: Detailed [No Acute Distress] : no acute distress Detail Level: Zone [Normal Venous Pressure] : normal venous pressure [Normal S1, S2] : normal S1, S2 [Clear Lung Fields] : clear lung fields [No Respiratory Distress] : no respiratory distress  [No Edema] : no edema [Moves all extremities] : moves all extremities [Alert and Oriented] : alert and oriented

## 2024-08-27 NOTE — DISCUSSION/SUMMARY
[EKG obtained to assist in diagnosis and management of assessed problem(s)] : EKG obtained to assist in diagnosis and management of assessed problem(s) [FreeTextEntry1] : The patient is a 78-year-old male with a history of asymptomatic sinus bradycardia and frequent PVCs s/p ablation of dominant PVC (9/2022 by Dr. Garcia). Presents today for arrhythmia follow-up, reports doing well overall, occasional, brief "extra beat" otherwise denies palpitations, dizziness or chest pain, baseline BENTLEY with strenuous activity. ECG SB with PVCs. Periodic assessment with outpatient monitoring (every 6-12 months) should be performed. If symptoms are frequent in association with a PVC burden of >5% in the future re-ablation can be considered.   Recommendations:  -1-week Holter placed in office today -Schedule TTE to eval ventricular/valvular function -Continue Toprol 25mg po daily -Continue to follow with cardiologist Dr Reyes -Follow-up with EP in 6-months

## 2024-08-27 NOTE — REASON FOR VISIT
[Arrhythmia/ECG Abnorrmalities] : arrhythmia/ECG abnormalities [FreeTextEntry3] : Dr Reyes at [facility]

## 2024-08-27 NOTE — REASON FOR VISIT
[Arrhythmia/ECG Abnorrmalities] : arrhythmia/ECG abnormalities [FreeTextEntry3] : Dr Reyes at [facility]

## 2024-08-27 NOTE — HISTORY OF PRESENT ILLNESS
[FreeTextEntry1] : The patient is a 78-year-old male presenting for follow up today. The patient has a history of frequent PVCs s/p ablation, asymptomatic sinus bradycardia, HTN, HLD, and hypothyroidism. He was noted to have frequent PVCs in 6/2022. Cardiac MRI showed normal function and no LV scar. He underwent a PVC ablation with Dr. Garcia in 9/2022 (targeted one out of four different PVCs) with some residual PVCs seen after the ablation procedure. He wore a monitor in 6/2023 which showed a very low burden. A monitor with Dr. Reyes showed a low burden (2%) and slow NSVT.   Presents today for arrhythmia follow-up, reports doing well overall, occasional, brief "extra beat" otherwise denies palpitations, dizziness or chest pain, baseline BENTLEY with strenuous activity.

## 2024-08-27 NOTE — END OF VISIT
[Time Spent: ___ minutes] : I have spent [unfilled] minutes of time on the encounter which excludes teaching and separately reported services. [FreeTextEntry3] : I, Dr. Marley, personally performed the evaluation and management (E/M) services for this new patient. That E/M includes conducting the clinically appropriate initial history &/or exam, assessing all conditions, and establishing the plan of care. Today, my assistant, Samia Greenberg NP, was here to observe my evaluation and management service for this patient & follow plan of care established by me going forward.

## 2024-08-27 NOTE — REVIEW OF SYSTEMS
[Dyspnea on exertion] : dyspnea during exertion [Feeling Fatigued] : not feeling fatigued [SOB] : no shortness of breath [Chest Discomfort] : no chest discomfort [Lower Ext Edema] : no extremity edema [Orthopnea] : no orthopnea [PND] : no PND [Syncope] : no syncope [Dizziness] : no dizziness [Easy Bleeding] : no tendency for easy bleeding [FreeTextEntry5] : occasional brief "extra beats"

## 2024-09-03 ENCOUNTER — APPOINTMENT (OUTPATIENT)
Dept: CARDIOLOGY | Facility: CLINIC | Age: 79
End: 2024-09-03

## 2024-09-18 ENCOUNTER — APPOINTMENT (OUTPATIENT)
Dept: CARDIOLOGY | Facility: CLINIC | Age: 79
End: 2024-09-18
Payer: MEDICARE

## 2024-09-18 PROCEDURE — 93306 TTE W/DOPPLER COMPLETE: CPT

## 2025-02-25 ENCOUNTER — APPOINTMENT (OUTPATIENT)
Dept: ELECTROPHYSIOLOGY | Facility: CLINIC | Age: 80
End: 2025-02-25
Payer: MEDICARE

## 2025-02-25 VITALS
OXYGEN SATURATION: 98 % | SYSTOLIC BLOOD PRESSURE: 132 MMHG | HEART RATE: 52 BPM | BODY MASS INDEX: 26.37 KG/M2 | WEIGHT: 174 LBS | HEIGHT: 68 IN | DIASTOLIC BLOOD PRESSURE: 72 MMHG

## 2025-02-25 DIAGNOSIS — I49.3 VENTRICULAR PREMATURE DEPOLARIZATION: ICD-10-CM

## 2025-02-25 PROCEDURE — 99214 OFFICE O/P EST MOD 30 MIN: CPT | Mod: 25

## 2025-02-25 PROCEDURE — 93000 ELECTROCARDIOGRAM COMPLETE: CPT | Mod: 59

## 2025-07-22 ENCOUNTER — APPOINTMENT (OUTPATIENT)
Dept: RADIATION ONCOLOGY | Facility: CLINIC | Age: 80
End: 2025-07-22

## 2025-09-02 ENCOUNTER — APPOINTMENT (OUTPATIENT)
Dept: FAMILY MEDICINE | Facility: CLINIC | Age: 80
End: 2025-09-02
Payer: MEDICARE

## 2025-09-02 ENCOUNTER — NON-APPOINTMENT (OUTPATIENT)
Age: 80
End: 2025-09-02

## 2025-09-02 VITALS
WEIGHT: 175 LBS | DIASTOLIC BLOOD PRESSURE: 60 MMHG | SYSTOLIC BLOOD PRESSURE: 112 MMHG | BODY MASS INDEX: 26.52 KG/M2 | HEIGHT: 68 IN

## 2025-09-02 DIAGNOSIS — Z79.899 OTHER LONG TERM (CURRENT) DRUG THERAPY: ICD-10-CM

## 2025-09-02 DIAGNOSIS — Z85.46 PERSONAL HISTORY OF MALIGNANT NEOPLASM OF PROSTATE: ICD-10-CM

## 2025-09-02 DIAGNOSIS — E78.2 MIXED HYPERLIPIDEMIA: ICD-10-CM

## 2025-09-02 DIAGNOSIS — N18.32 CHRONIC KIDNEY DISEASE, STAGE 3B: ICD-10-CM

## 2025-09-02 DIAGNOSIS — Z23 ENCOUNTER FOR IMMUNIZATION: ICD-10-CM

## 2025-09-02 DIAGNOSIS — E03.4 ATROPHY OF THYROID (ACQUIRED): ICD-10-CM

## 2025-09-02 DIAGNOSIS — I10 ESSENTIAL (PRIMARY) HYPERTENSION: ICD-10-CM

## 2025-09-02 PROCEDURE — G0008: CPT

## 2025-09-02 PROCEDURE — 99214 OFFICE O/P EST MOD 30 MIN: CPT | Mod: 25

## 2025-09-02 PROCEDURE — 36415 COLL VENOUS BLD VENIPUNCTURE: CPT

## 2025-09-02 PROCEDURE — 90662 IIV NO PRSV INCREASED AG IM: CPT

## 2025-09-04 LAB
ALBUMIN SERPL ELPH-MCNC: 4.4 G/DL
ALP BLD-CCNC: 56 U/L
ALT SERPL-CCNC: 16 U/L
ANION GAP SERPL CALC-SCNC: 11 MMOL/L
APPEARANCE: CLEAR
AST SERPL-CCNC: 27 U/L
BACTERIA: NEGATIVE /HPF
BASOPHILS # BLD AUTO: 0.04 K/UL
BASOPHILS NFR BLD AUTO: 1 %
BILIRUB SERPL-MCNC: 0.4 MG/DL
BILIRUBIN URINE: NEGATIVE
BLOOD URINE: NEGATIVE
BUN SERPL-MCNC: 25 MG/DL
CALCIUM SERPL-MCNC: 9.7 MG/DL
CAST: 0 /LPF
CHLORIDE SERPL-SCNC: 106 MMOL/L
CHOLEST SERPL-MCNC: 147 MG/DL
CO2 SERPL-SCNC: 25 MMOL/L
COLOR: YELLOW
CREAT SERPL-MCNC: 2.12 MG/DL
EGFRCR SERPLBLD CKD-EPI 2021: 31 ML/MIN/1.73M2
EOSINOPHIL # BLD AUTO: 0.2 K/UL
EOSINOPHIL NFR BLD AUTO: 5.2 %
EPITHELIAL CELLS: 0 /HPF
ESTIMATED AVERAGE GLUCOSE: 105 MG/DL
GLUCOSE QUALITATIVE U: NEGATIVE MG/DL
GLUCOSE SERPL-MCNC: 94 MG/DL
HBA1C MFR BLD HPLC: 5.3 %
HCT VFR BLD CALC: 42.4 %
HDLC SERPL-MCNC: 71 MG/DL
HGB BLD-MCNC: 13.5 G/DL
IMM GRANULOCYTES NFR BLD AUTO: 0 %
KETONES URINE: NEGATIVE MG/DL
LDLC SERPL-MCNC: 63 MG/DL
LEUKOCYTE ESTERASE URINE: NEGATIVE
LYMPHOCYTES # BLD AUTO: 1.13 K/UL
LYMPHOCYTES NFR BLD AUTO: 29.1 %
MAN DIFF?: NORMAL
MCHC RBC-ENTMCNC: 31.8 G/DL
MCHC RBC-ENTMCNC: 32.8 PG
MCV RBC AUTO: 103.2 FL
MICROSCOPIC-UA: NORMAL
MONOCYTES # BLD AUTO: 0.35 K/UL
MONOCYTES NFR BLD AUTO: 9 %
NEUTROPHILS # BLD AUTO: 2.16 K/UL
NEUTROPHILS NFR BLD AUTO: 55.7 %
NITRITE URINE: NEGATIVE
NONHDLC SERPL-MCNC: 76 MG/DL
PH URINE: 6.5
PLATELET # BLD AUTO: 200 K/UL
POTASSIUM SERPL-SCNC: 5 MMOL/L
PROT SERPL-MCNC: 6.9 G/DL
PROTEIN URINE: 30 MG/DL
RBC # BLD: 4.11 M/UL
RBC # FLD: 14.3 %
RED BLOOD CELLS URINE: 1 /HPF
SODIUM SERPL-SCNC: 142 MMOL/L
SPECIFIC GRAVITY URINE: 1.02
TRIGL SERPL-MCNC: 59 MG/DL
TSH SERPL-ACNC: 1.82 UIU/ML
UROBILINOGEN URINE: 0.2 MG/DL
WBC # FLD AUTO: 3.88 K/UL
WHITE BLOOD CELLS URINE: 0 /HPF